# Patient Record
Sex: MALE | Race: WHITE | NOT HISPANIC OR LATINO | Employment: OTHER | URBAN - METROPOLITAN AREA
[De-identification: names, ages, dates, MRNs, and addresses within clinical notes are randomized per-mention and may not be internally consistent; named-entity substitution may affect disease eponyms.]

---

## 2023-10-07 ENCOUNTER — APPOINTMENT (OUTPATIENT)
Dept: RADIOLOGY | Facility: HOSPITAL | Age: 83
End: 2023-10-07
Payer: MEDICARE

## 2023-10-07 ENCOUNTER — HOSPITAL ENCOUNTER (EMERGENCY)
Facility: HOSPITAL | Age: 83
Discharge: HOME | End: 2023-10-07
Attending: STUDENT IN AN ORGANIZED HEALTH CARE EDUCATION/TRAINING PROGRAM
Payer: MEDICARE

## 2023-10-07 VITALS
HEIGHT: 68 IN | OXYGEN SATURATION: 98 % | RESPIRATION RATE: 18 BRPM | BODY MASS INDEX: 31.83 KG/M2 | TEMPERATURE: 97 F | WEIGHT: 210 LBS | DIASTOLIC BLOOD PRESSURE: 89 MMHG | SYSTOLIC BLOOD PRESSURE: 146 MMHG | HEART RATE: 84 BPM

## 2023-10-07 DIAGNOSIS — M51.36 BULGE OF LUMBAR DISC WITHOUT MYELOPATHY: Primary | ICD-10-CM

## 2023-10-07 DIAGNOSIS — G89.29 CHRONIC BILATERAL LOW BACK PAIN, UNSPECIFIED WHETHER SCIATICA PRESENT: ICD-10-CM

## 2023-10-07 DIAGNOSIS — M54.50 CHRONIC BILATERAL LOW BACK PAIN, UNSPECIFIED WHETHER SCIATICA PRESENT: ICD-10-CM

## 2023-10-07 PROCEDURE — G1004 CDSM NDSC: HCPCS

## 2023-10-07 PROCEDURE — 2500000005 HC RX 250 GENERAL PHARMACY W/O HCPCS: Performed by: STUDENT IN AN ORGANIZED HEALTH CARE EDUCATION/TRAINING PROGRAM

## 2023-10-07 PROCEDURE — 99284 EMERGENCY DEPT VISIT MOD MDM: CPT | Performed by: STUDENT IN AN ORGANIZED HEALTH CARE EDUCATION/TRAINING PROGRAM

## 2023-10-07 PROCEDURE — 76377 3D RENDER W/INTRP POSTPROCES: CPT | Performed by: RADIOLOGY

## 2023-10-07 PROCEDURE — 2500000004 HC RX 250 GENERAL PHARMACY W/ HCPCS (ALT 636 FOR OP/ED): Performed by: STUDENT IN AN ORGANIZED HEALTH CARE EDUCATION/TRAINING PROGRAM

## 2023-10-07 PROCEDURE — 72192 CT PELVIS W/O DYE: CPT | Performed by: RADIOLOGY

## 2023-10-07 RX ORDER — LIDOCAINE 50 MG/G
1 PATCH TOPICAL DAILY
Qty: 10 PATCH | Refills: 0 | Status: SHIPPED | OUTPATIENT
Start: 2023-10-07

## 2023-10-07 RX ORDER — HYDROMORPHONE HYDROCHLORIDE 1 MG/ML
1 INJECTION, SOLUTION INTRAMUSCULAR; INTRAVENOUS; SUBCUTANEOUS ONCE
Status: COMPLETED | OUTPATIENT
Start: 2023-10-07 | End: 2023-10-07

## 2023-10-07 RX ORDER — KETOROLAC TROMETHAMINE 30 MG/ML
15 INJECTION, SOLUTION INTRAMUSCULAR; INTRAVENOUS ONCE
Status: COMPLETED | OUTPATIENT
Start: 2023-10-07 | End: 2023-10-07

## 2023-10-07 RX ORDER — LIDOCAINE 560 MG/1
1 PATCH PERCUTANEOUS; TOPICAL; TRANSDERMAL ONCE
Status: DISCONTINUED | OUTPATIENT
Start: 2023-10-07 | End: 2023-10-07 | Stop reason: HOSPADM

## 2023-10-07 RX ORDER — HYDROCODONE BITARTRATE AND ACETAMINOPHEN 5; 325 MG/1; MG/1
1 TABLET ORAL EVERY 6 HOURS PRN
Qty: 12 TABLET | Refills: 0 | Status: SHIPPED | OUTPATIENT
Start: 2023-10-07 | End: 2023-10-10

## 2023-10-07 RX ADMIN — HYDROMORPHONE HYDROCHLORIDE 1 MG: 1 INJECTION, SOLUTION INTRAMUSCULAR; INTRAVENOUS; SUBCUTANEOUS at 03:52

## 2023-10-07 RX ADMIN — LIDOCAINE 1 PATCH: 4 PATCH TOPICAL at 03:53

## 2023-10-07 RX ADMIN — KETOROLAC TROMETHAMINE 15 MG: 30 INJECTION, SOLUTION INTRAMUSCULAR at 03:52

## 2023-10-07 ASSESSMENT — COLUMBIA-SUICIDE SEVERITY RATING SCALE - C-SSRS
1. IN THE PAST MONTH, HAVE YOU WISHED YOU WERE DEAD OR WISHED YOU COULD GO TO SLEEP AND NOT WAKE UP?: NO
2. HAVE YOU ACTUALLY HAD ANY THOUGHTS OF KILLING YOURSELF?: NO
6. HAVE YOU EVER DONE ANYTHING, STARTED TO DO ANYTHING, OR PREPARED TO DO ANYTHING TO END YOUR LIFE?: NO

## 2023-10-07 ASSESSMENT — LIFESTYLE VARIABLES
HAVE YOU EVER FELT YOU SHOULD CUT DOWN ON YOUR DRINKING: NO
EVER HAD A DRINK FIRST THING IN THE MORNING TO STEADY YOUR NERVES TO GET RID OF A HANGOVER: NO
HAVE PEOPLE ANNOYED YOU BY CRITICIZING YOUR DRINKING: NO
EVER FELT BAD OR GUILTY ABOUT YOUR DRINKING: NO

## 2023-10-07 ASSESSMENT — PAIN DESCRIPTION - LOCATION: LOCATION: HIP

## 2023-10-07 ASSESSMENT — PAIN SCALES - GENERAL: PAINLEVEL_OUTOF10: 10 - WORST POSSIBLE PAIN

## 2023-10-07 ASSESSMENT — PAIN - FUNCTIONAL ASSESSMENT: PAIN_FUNCTIONAL_ASSESSMENT: 0-10

## 2023-10-07 NOTE — ED PROVIDER NOTES
"HPI   Chief Complaint   Patient presents with    Back Pain     Chronic lower back pain       HPI  Patient presents to the ER with complaints of persistent pelvic pain and back pain.  Its been ongoing for multiple weeks now.  He tells me he is admitted to the hospital several weeks ago and had a CT and MRI.  [facility].  He states he is taking Cymbalta for pain without relief.  He states \"[facility] and I just got delivered today so for the last week I have not really been taking anything \".                  Yung Coma Scale Score: 15                  Patient History   No past medical history on file.  Past Surgical History:   Procedure Laterality Date    US GUIDED BIOPSY PROSTATE  8/21/2019    US GUIDED BIOPSY PROSTATE 8/21/2019 [facility]     No family history on file.  Social History     Tobacco Use    Smoking status: Not on file    Smokeless tobacco: Not on file   Substance Use Topics    Alcohol use: Not on file    Drug use: Not on file       Physical Exam   ED Triage Vitals [10/07/23 0255]   Temp Heart Rate Resp BP   36.1 °C (97 °F) 95 16 (!) 148/91      SpO2 Temp Source Heart Rate Source Patient Position   99 % Temporal -- --      BP Location FiO2 (%)     -- --       Physical Exam  Vitals and nursing note reviewed.   Constitutional:       General: He is not in acute distress.     Appearance: He is well-developed.   HENT:      Head: Normocephalic and atraumatic.   Eyes:      Conjunctiva/sclera: Conjunctivae normal.   Cardiovascular:      Rate and Rhythm: Normal rate and regular rhythm.      Heart sounds: No murmur heard.  Pulmonary:      Effort: Pulmonary effort is normal. No respiratory distress.      Breath sounds: Normal breath sounds.   Abdominal:      Palpations: Abdomen is soft.      Tenderness: There is no abdominal tenderness.   Musculoskeletal:         General: No swelling.      Cervical back: Neck supple.      Comments: Reproducible " "tenderness to palpation along the bilateral posterior and lateral iliac crest.  No midline vertebral tenderness or deformities or step-offs are palpable.   Skin:     General: Skin is warm and dry.      Capillary Refill: Capillary refill takes less than 2 seconds.   Neurological:      Mental Status: He is alert.      Comments: GCS 15 alert and oriented x4.  5 out of 5 strength in bilateral upper and lower extremities.  No saddle anesthesia.   Psychiatric:         Mood and Affect: Mood normal.         ED Course & MDM   Diagnoses as of 10/07/23 0504   Bulge of lumbar disc without myelopathy   Chronic bilateral low back pain, unspecified whether sciatica present     Patient presents to the ER with complaints of persistent pelvic pain and back pain.  Its been ongoing for multiple weeks now.  He tells me he is admitted to the hospital several weeks ago and had a CT and MRI.  He is due to follow[facility] with [facility].  He states he is taking Cymbalta for pain without relief.  He states \"[facility] and I just got delivered today so for the last week I have not really been taking anything \".    Patient denies any urinary retention or incontinence.  Denies any bowel incontinence or retention.  Denies any saddle anesthesia.  States he has normal sensation over his anal sphincter when he wipes after bowel movements.  Denies history of cancer.  I saw and chart reviewed the patient actually had an MRI done last month as well for his lumbar spine.  It did not show neurological/spinal cord involvement but rather disc bulges at multiple levels in the lumbar region.  His CT scan at that time showed persistent findings.  Today, I did a CT of the pelvis to evaluate for possible occult fracture and none were noted.  His pain was well controlled with IM Toradol and Dilaudid.  Recently, he had normal renal function done and I ensured this prior to giving him Toradol.  He tells me his pain is improved significantly. "  I will discharge him on Lidoderm patch and short course of Norco and he will follow-up with his doctor.  Medical Decision Making      Procedure  Procedures     Ganesh Renee MD  10/07/23 1359

## 2023-10-17 ENCOUNTER — HOSPITAL ENCOUNTER (EMERGENCY)
Facility: HOSPITAL | Age: 83
Discharge: HOME | End: 2023-10-17
Payer: MEDICARE

## 2023-10-17 VITALS
WEIGHT: 210 LBS | DIASTOLIC BLOOD PRESSURE: 77 MMHG | SYSTOLIC BLOOD PRESSURE: 168 MMHG | RESPIRATION RATE: 16 BRPM | OXYGEN SATURATION: 97 % | TEMPERATURE: 97.9 F | HEIGHT: 68 IN | BODY MASS INDEX: 31.83 KG/M2 | HEART RATE: 87 BPM

## 2023-10-17 DIAGNOSIS — M54.50 LUMBAR BACK PAIN: Primary | ICD-10-CM

## 2023-10-17 PROCEDURE — 2500000004 HC RX 250 GENERAL PHARMACY W/ HCPCS (ALT 636 FOR OP/ED): Performed by: PHYSICIAN ASSISTANT

## 2023-10-17 PROCEDURE — 99284 EMERGENCY DEPT VISIT MOD MDM: CPT

## 2023-10-17 PROCEDURE — 2500000001 HC RX 250 WO HCPCS SELF ADMINISTERED DRUGS (ALT 637 FOR MEDICARE OP): Performed by: PHYSICIAN ASSISTANT

## 2023-10-17 RX ORDER — ACETAMINOPHEN 325 MG/1
650 TABLET ORAL EVERY 6 HOURS PRN
Qty: 30 TABLET | Refills: 0 | Status: SHIPPED | OUTPATIENT
Start: 2023-10-17 | End: 2023-10-22

## 2023-10-17 RX ORDER — OXYCODONE AND ACETAMINOPHEN 5; 325 MG/1; MG/1
1 TABLET ORAL ONCE
Status: COMPLETED | OUTPATIENT
Start: 2023-10-17 | End: 2023-10-17

## 2023-10-17 RX ORDER — KETOROLAC TROMETHAMINE 30 MG/ML
15 INJECTION, SOLUTION INTRAMUSCULAR; INTRAVENOUS ONCE
Status: COMPLETED | OUTPATIENT
Start: 2023-10-17 | End: 2023-10-17

## 2023-10-17 RX ADMIN — KETOROLAC TROMETHAMINE 15 MG: 30 INJECTION, SOLUTION INTRAMUSCULAR at 04:46

## 2023-10-17 RX ADMIN — OXYCODONE HYDROCHLORIDE AND ACETAMINOPHEN 1 TABLET: 5; 325 TABLET ORAL at 04:54

## 2023-10-17 ASSESSMENT — COLUMBIA-SUICIDE SEVERITY RATING SCALE - C-SSRS
2. HAVE YOU ACTUALLY HAD ANY THOUGHTS OF KILLING YOURSELF?: NO
1. IN THE PAST MONTH, HAVE YOU WISHED YOU WERE DEAD OR WISHED YOU COULD GO TO SLEEP AND NOT WAKE UP?: NO
6. HAVE YOU EVER DONE ANYTHING, STARTED TO DO ANYTHING, OR PREPARED TO DO ANYTHING TO END YOUR LIFE?: NO

## 2023-10-17 NOTE — ED PROVIDER NOTES
"HPI   Chief Complaint   Patient presents with    Back Pain     Pt states he has been having back pain, he was recently admitted for 6 days and \"had to have needles in his back\"       83-year-old male with a history of hypertension and diabetes, arthritis presenting to the ED today with continued lower back pain.  Patient states he has had this pain for years and is normally in pain management where he gets injections into his back.  He is not scheduled to see pain management until next month and he has not had any recent falls or injuries but he is still having pain in his lower back that is not relieved with taking Lidoderm patches at home.  Patient tells me he was recently admitted for this and he was in the ER 7 days ago for this pain.  Pain is only in his lower back, sometimes radiates down to the mid thigh in both legs but he has no numbness or weakness or paresthesias.  He denies abdominal pain or scrotal pain or swelling.  He denies fever, nausea or vomiting or changes to his appetite.  He has been having normal bowel movements and denies difficulty urinating, painful urination or blood in his urine.  He denies loss of bowel or bladder control or saddle anesthesias.  He states that he ran out of pain medication at home.  No further complaints at this time.      History provided by:  Patient and spouse                      No data recorded                Patient History   History reviewed. No pertinent past medical history.  Past Surgical History:   Procedure Laterality Date    US GUIDED BIOPSY PROSTATE  8/21/2019    US GUIDED BIOPSY PROSTATE 8/21/2019 [facility]     No family history on file.  Social History     Tobacco Use    Smoking status: Not on file    Smokeless tobacco: Not on file   Substance Use Topics    Alcohol use: Not on file    Drug use: Not on file       Physical Exam   ED Triage Vitals [10/17/23 0349]   Temp Heart Rate Resp BP   36.6 °C (97.9 °F) 91 16 174/79      SpO2 Temp Source " Heart Rate Source Patient Position   96 % Temporal -- Sitting      BP Location FiO2 (%)     Right arm --       Physical Exam  Constitutional:       General: He is not in acute distress.     Appearance: Normal appearance. He is not toxic-appearing.   Eyes:      Conjunctiva/sclera: Conjunctivae normal.   Cardiovascular:      Rate and Rhythm: Normal rate and regular rhythm.      Pulses: Normal pulses.   Pulmonary:      Effort: Pulmonary effort is normal.      Breath sounds: Normal breath sounds.   Abdominal:      General: Bowel sounds are normal. There is no distension.      Palpations: Abdomen is soft. There is no mass.      Tenderness: There is no abdominal tenderness. There is no right CVA tenderness, left CVA tenderness or rebound.      Hernia: No hernia is present.   Musculoskeletal:      Cervical back: No rigidity.      Right lower leg: No edema.      Left lower leg: No edema.      Comments: Lower lumbar spinal and paraspinal tenderness without step-offs or erythema or edema or ecchymosis.  5/5 strength and sensation to bilateral lower extremities without bony tenderness or bony deformity.  No calf tenderness or swelling bilaterally. NVI   Skin:     General: Skin is warm and dry.      Capillary Refill: Capillary refill takes less than 2 seconds.   Neurological:      Mental Status: He is alert and oriented to person, place, and time. Mental status is at baseline.         ED Course & MDM   Diagnoses as of 10/17/23 0609   Lumbar back pain       Medical Decision Making  83-year-old male with history of hypertension and diabetes, arthritis and chronic low back pain presenting to the ED today with continued lower back pain.  There is been no fall or injury and patient was admitted to the beginning of September where he had an MRI performed.  He was [facility] as well as with pain management as he has already been seen by them previously.  His appointment is not until the beginning of November  and he already saw the [facility] but they did not give him anything further for pain.  He is still having pain in the lower back intermittently radiating down to the bilateral mid thighs but no numbness or weakness or paresthesias associated with this.  No saddle anesthesias or loss of bowel or bladder control or difficulty urinating.  He has no abdominal pain and does not feel lightheaded or dizzy.  He has no further associated complaints and arrives afebrile with stable vital signs.  He is nontoxic-appearing, awake alert and oriented and his speech is clear.  He is resting without signs of acute distress.  On my exam heart RRR, lungs are clear and abdomen soft nondistended nontender with normal bowel sounds.  There is no peripheral edema or calf tenderness and is good distal pulses, cap refill less than 2 seconds and is perfusing well.  He is neurovascularly intact throughout the bilateral lower extremities.  He is tender to palpation both spinal and paraspinal over the lower lumbar spine but there is no signs of trauma or infection.  His exam is otherwise within normal limits.  Patient already has Lidoderm patch on, he tells me the IM injection he had last time helped his pain so I did order him IM Toradol as he does have normal renal function due to his recent old labs.  I also ordered a p.o. Percocet and he will be reassessed.    I did review that the patient had an MRI of his L-spine performed at the beginning of September and showed some arthritic changes and disc bulges however no central spinal canal stenosis.  He also had a CT pelvis performed on 10/7, 10 days ago recent at his recent visit which did not show any acute osseous abnormality.  Patient is scheduled to see pain management, he does not have any neurologic deficits today, vital signs are stable and his pain has been ongoing for years.    On reassessment patient is resting comfortably and feels improved after medication given today.  He has not been  taking anything p.o. at home for pain relief other than topical Lidoderm patches.  He had a recent prescription of Norco home-going and I discussed with the patient that we will supplement his Lidoderm patches at this time with Tylenol.  I do not feel he would benefit from steroids as he is a diabetic.  He is not having any neurologic deficits or loss of bowel or bladder control.  No fevers and he is otherwise resting comfortably and is neurologically intact.  He has follow-up with pain management next week.  I did discuss results, diagnosis and treatment plan as well as warning signs to return to the ER and he and spouse expressed understanding and agreed with this plan today.        Procedure  Procedures     Diana Junior PA-C  10/17/23 0611

## 2023-10-22 ENCOUNTER — HOSPITAL ENCOUNTER (EMERGENCY)
Facility: HOSPITAL | Age: 83
Discharge: HOME | End: 2023-10-22
Attending: STUDENT IN AN ORGANIZED HEALTH CARE EDUCATION/TRAINING PROGRAM
Payer: MEDICARE

## 2023-10-22 VITALS
TEMPERATURE: 97.7 F | DIASTOLIC BLOOD PRESSURE: 66 MMHG | HEIGHT: 68 IN | BODY MASS INDEX: 31.83 KG/M2 | RESPIRATION RATE: 17 BRPM | HEART RATE: 77 BPM | WEIGHT: 210 LBS | SYSTOLIC BLOOD PRESSURE: 153 MMHG | OXYGEN SATURATION: 100 %

## 2023-10-22 DIAGNOSIS — G89.29 CHRONIC BILATERAL LOW BACK PAIN, UNSPECIFIED WHETHER SCIATICA PRESENT: Primary | ICD-10-CM

## 2023-10-22 DIAGNOSIS — M54.50 CHRONIC BILATERAL LOW BACK PAIN, UNSPECIFIED WHETHER SCIATICA PRESENT: Primary | ICD-10-CM

## 2023-10-22 PROCEDURE — 2500000004 HC RX 250 GENERAL PHARMACY W/ HCPCS (ALT 636 FOR OP/ED): Performed by: STUDENT IN AN ORGANIZED HEALTH CARE EDUCATION/TRAINING PROGRAM

## 2023-10-22 PROCEDURE — 99284 EMERGENCY DEPT VISIT MOD MDM: CPT | Performed by: STUDENT IN AN ORGANIZED HEALTH CARE EDUCATION/TRAINING PROGRAM

## 2023-10-22 PROCEDURE — 2500000005 HC RX 250 GENERAL PHARMACY W/O HCPCS: Performed by: STUDENT IN AN ORGANIZED HEALTH CARE EDUCATION/TRAINING PROGRAM

## 2023-10-22 PROCEDURE — 96372 THER/PROPH/DIAG INJ SC/IM: CPT

## 2023-10-22 PROCEDURE — 99283 EMERGENCY DEPT VISIT LOW MDM: CPT | Mod: 25

## 2023-10-22 RX ORDER — LIDOCAINE 40 MG/G
CREAM TOPICAL AS NEEDED
Qty: 10 EACH | Refills: 0 | Status: SHIPPED | OUTPATIENT
Start: 2023-10-22

## 2023-10-22 RX ORDER — HYDROMORPHONE HYDROCHLORIDE 1 MG/ML
1 INJECTION, SOLUTION INTRAMUSCULAR; INTRAVENOUS; SUBCUTANEOUS ONCE
Status: COMPLETED | OUTPATIENT
Start: 2023-10-22 | End: 2023-10-22

## 2023-10-22 RX ORDER — ACETAMINOPHEN 325 MG/1
975 TABLET ORAL ONCE
Status: DISCONTINUED | OUTPATIENT
Start: 2023-10-22 | End: 2023-10-22 | Stop reason: HOSPADM

## 2023-10-22 RX ORDER — KETOROLAC TROMETHAMINE 30 MG/ML
15 INJECTION, SOLUTION INTRAMUSCULAR; INTRAVENOUS ONCE
Status: COMPLETED | OUTPATIENT
Start: 2023-10-22 | End: 2023-10-22

## 2023-10-22 RX ORDER — OXYCODONE HYDROCHLORIDE 5 MG/1
5 TABLET ORAL EVERY 6 HOURS PRN
Qty: 12 TABLET | Refills: 0 | Status: SHIPPED | OUTPATIENT
Start: 2023-10-22 | End: 2023-10-25

## 2023-10-22 RX ORDER — HYDROMORPHONE HYDROCHLORIDE 1 MG/ML
1 INJECTION, SOLUTION INTRAMUSCULAR; INTRAVENOUS; SUBCUTANEOUS ONCE
Status: DISCONTINUED | OUTPATIENT
Start: 2023-10-22 | End: 2023-10-22

## 2023-10-22 RX ORDER — LIDOCAINE 560 MG/1
2 PATCH PERCUTANEOUS; TOPICAL; TRANSDERMAL ONCE
Status: DISCONTINUED | OUTPATIENT
Start: 2023-10-22 | End: 2023-10-22 | Stop reason: HOSPADM

## 2023-10-22 RX ADMIN — KETOROLAC TROMETHAMINE 15 MG: 30 INJECTION, SOLUTION INTRAMUSCULAR at 03:14

## 2023-10-22 RX ADMIN — LIDOCAINE 2 PATCH: 4 PATCH TOPICAL at 03:13

## 2023-10-22 RX ADMIN — HYDROMORPHONE HYDROCHLORIDE 1 MG: 1 INJECTION, SOLUTION INTRAMUSCULAR; INTRAVENOUS; SUBCUTANEOUS at 03:31

## 2023-10-22 ASSESSMENT — PAIN - FUNCTIONAL ASSESSMENT: PAIN_FUNCTIONAL_ASSESSMENT: 0-10

## 2023-10-22 ASSESSMENT — LIFESTYLE VARIABLES
HAVE YOU EVER FELT YOU SHOULD CUT DOWN ON YOUR DRINKING: NO
EVER HAD A DRINK FIRST THING IN THE MORNING TO STEADY YOUR NERVES TO GET RID OF A HANGOVER: NO
HAVE PEOPLE ANNOYED YOU BY CRITICIZING YOUR DRINKING: NO
REASON UNABLE TO ASSESS: NO
EVER FELT BAD OR GUILTY ABOUT YOUR DRINKING: NO

## 2023-10-22 ASSESSMENT — PAIN DESCRIPTION - PAIN TYPE: TYPE: ACUTE PAIN

## 2023-10-22 ASSESSMENT — PAIN DESCRIPTION - DESCRIPTORS: DESCRIPTORS: STABBING

## 2023-10-22 ASSESSMENT — PAIN DESCRIPTION - LOCATION: LOCATION: BACK

## 2023-10-22 ASSESSMENT — PAIN DESCRIPTION - ORIENTATION: ORIENTATION: LOWER

## 2023-10-22 ASSESSMENT — PAIN SCALES - GENERAL
PAINLEVEL_OUTOF10: 8
PAINLEVEL_OUTOF10: 10 - WORST POSSIBLE PAIN
PAINLEVEL_OUTOF10: 10 - WORST POSSIBLE PAIN

## 2023-10-22 NOTE — ED PROVIDER NOTES
"HPI   Chief Complaint   Patient presents with    Back Pain     Pt states \"I've been here several times for back pain. They've done all the scans but say there is nothing they can do. [facility] but can't in this condition\"       Patient is an 83-year-old male with history of hypertension, diabetes, arthritis who presents for back pain.  Patient states he has had back pain similar to this for several months.  States it is across his low back.  Denies any new trauma.  Denies saddle anesthesia, urinary tension, bowel control problems, fevers, chills, IV drug use.  Patient's been seen several times for similar back pain and improved with pain medications.  Patient states he has an appointment on Monday to see his pain management doctor for further evaluation.                          No data recorded                Patient History   No past medical history on file.  Past Surgical History:   Procedure Laterality Date    US GUIDED BIOPSY PROSTATE  8/21/2019    US GUIDED BIOPSY PROSTATE 8/21/2019 [facility]     No family history on file.  Social History     Tobacco Use    Smoking status: Not on file    Smokeless tobacco: Not on file   Substance Use Topics    Alcohol use: Not on file    Drug use: Not on file       Physical Exam   ED Triage Vitals [10/22/23 0227]   Temp Heart Rate Resp BP   36.5 °C (97.7 °F) 93 18 (!) 185/89      SpO2 Temp Source Heart Rate Source Patient Position   96 % Temporal -- Sitting      BP Location FiO2 (%)     Right arm --       Physical Exam  Constitutional:       General: He is not in acute distress.  HENT:      Head: Normocephalic.   Eyes:      Extraocular Movements: Extraocular movements intact.      Conjunctiva/sclera: Conjunctivae normal.      Pupils: Pupils are equal, round, and reactive to light.   Cardiovascular:      Rate and Rhythm: Normal rate and regular rhythm.      Pulses: Normal pulses.      Heart sounds: Normal heart sounds.   Pulmonary: "      Effort: Pulmonary effort is normal.      Breath sounds: Normal breath sounds.   Abdominal:      General: There is no distension.      Palpations: Abdomen is soft. There is no mass.      Tenderness: There is no abdominal tenderness. There is no guarding.   Musculoskeletal:         General: No deformity.      Cervical back: Normal range of motion and neck supple.      Right lower leg: No edema.      Left lower leg: No edema.      Comments: TTP across low back   Skin:     General: Skin is warm and dry.      Findings: No lesion or rash.   Neurological:      General: No focal deficit present.      Mental Status: He is alert and oriented to person, place, and time. Mental status is at baseline.      Cranial Nerves: No cranial nerve deficit.      Sensory: No sensory deficit.      Motor: No weakness.   Psychiatric:         Mood and Affect: Mood normal.         ED Course & MDM   Diagnoses as of 10/22/23 0434   Chronic bilateral low back pain, unspecified whether sciatica present       Medical Decision Making  Patient is an 83-year-old male with above-stated past medical history who presents for back pain.  Patient is no red flag symptoms on my history or physical exam.  His back pain appears chronic.  He has no new trauma.  I did review the patient's laboratory results from September and his creatinine is appropriate.  He was given a dose of Toradol, Dilaudid, lidocaine patch in the emergency department.  I have low suspicion for cauda equina, spinal dural abscess, cord compression syndrome.  He is not tachycardic or febrile and appears currently well and nontoxic.  On reevaluation he does appear more comfortable and states his pain is improved.  I do believe the patient would benefit from seeing his physician on Monday and patient agrees with this.  I do not believe he requires admission at this time. I reviewed the patient's recent imaging results and do not believe he requires additional imaging at this time given  his history and physical exam.  Patient was given a short course of oxycodone, and a prescription for lidocaine patches.  Patient was discharged home with return precautions and follow-up instructions.    Disclaimer: This note was dictated using speech recognition software. Minor errors in transcription may be present. Please call if questions.     Robby Ross MD  [facility] Emergency Medicine  Contact on [employer]            Procedure  Procedures     Robby Ross MD  10/22/23 0435

## 2024-01-09 ENCOUNTER — APPOINTMENT (OUTPATIENT)
Dept: RADIOLOGY | Facility: HOSPITAL | Age: 84
DRG: 552 | End: 2024-01-09
Payer: OTHER GOVERNMENT

## 2024-01-09 ENCOUNTER — APPOINTMENT (OUTPATIENT)
Dept: CARDIOLOGY | Facility: HOSPITAL | Age: 84
DRG: 552 | End: 2024-01-09
Payer: OTHER GOVERNMENT

## 2024-01-09 ENCOUNTER — HOSPITAL ENCOUNTER (INPATIENT)
Facility: HOSPITAL | Age: 84
LOS: 1 days | Discharge: ANOTHER HOSPITAL | DRG: 552 | End: 2024-01-11
Attending: STUDENT IN AN ORGANIZED HEALTH CARE EDUCATION/TRAINING PROGRAM | Admitting: INTERNAL MEDICINE
Payer: OTHER GOVERNMENT

## 2024-01-09 DIAGNOSIS — M54.59 INTRACTABLE LOW BACK PAIN: Primary | ICD-10-CM

## 2024-01-09 DIAGNOSIS — Z78.9 FAILURE OF OUTPATIENT TREATMENT: ICD-10-CM

## 2024-01-09 DIAGNOSIS — M46.46 LUMBAR DISCITIS: ICD-10-CM

## 2024-01-09 DIAGNOSIS — M46.26 OSTEOMYELITIS OF LUMBAR SPINE (MULTI): ICD-10-CM

## 2024-01-09 DIAGNOSIS — R29.6 FREQUENT FALLS: ICD-10-CM

## 2024-01-09 DIAGNOSIS — M46.26 ACUTE OSTEOMYELITIS OF LUMBAR SPINE (MULTI): ICD-10-CM

## 2024-01-09 LAB
ALBUMIN SERPL BCP-MCNC: 3.8 G/DL (ref 3.4–5)
ALP SERPL-CCNC: 56 U/L (ref 33–136)
ALT SERPL W P-5'-P-CCNC: 11 U/L (ref 10–52)
ANION GAP SERPL CALC-SCNC: 16 MMOL/L (ref 10–20)
AST SERPL W P-5'-P-CCNC: 13 U/L (ref 9–39)
BASOPHILS # BLD AUTO: 0.08 X10*3/UL (ref 0–0.1)
BASOPHILS NFR BLD AUTO: 0.7 %
BILIRUB SERPL-MCNC: 0.4 MG/DL (ref 0–1.2)
BUN SERPL-MCNC: 42 MG/DL (ref 6–23)
CALCIUM SERPL-MCNC: 9.1 MG/DL (ref 8.6–10.3)
CARDIAC TROPONIN I PNL SERPL HS: 10 NG/L (ref 0–20)
CARDIAC TROPONIN I PNL SERPL HS: 11 NG/L (ref 0–20)
CHLORIDE SERPL-SCNC: 94 MMOL/L (ref 98–107)
CO2 SERPL-SCNC: 25 MMOL/L (ref 21–32)
CREAT SERPL-MCNC: 1.25 MG/DL (ref 0.5–1.3)
CRP SERPL-MCNC: 7.25 MG/DL
EGFRCR SERPLBLD CKD-EPI 2021: 58 ML/MIN/1.73M*2
EOSINOPHIL # BLD AUTO: 0.19 X10*3/UL (ref 0–0.4)
EOSINOPHIL NFR BLD AUTO: 1.7 %
ERYTHROCYTE [DISTWIDTH] IN BLOOD BY AUTOMATED COUNT: 13.8 % (ref 11.5–14.5)
ERYTHROCYTE [SEDIMENTATION RATE] IN BLOOD BY WESTERGREN METHOD: 37 MM/H (ref 0–20)
GLUCOSE SERPL-MCNC: 320 MG/DL (ref 74–99)
HCT VFR BLD AUTO: 32 % (ref 41–52)
HGB BLD-MCNC: 10.8 G/DL (ref 13.5–17.5)
IMM GRANULOCYTES # BLD AUTO: 0.04 X10*3/UL (ref 0–0.5)
IMM GRANULOCYTES NFR BLD AUTO: 0.4 % (ref 0–0.9)
INR PPP: 1 (ref 0.9–1.1)
LACTATE SERPL-SCNC: 1.5 MMOL/L (ref 0.4–2)
LACTATE SERPL-SCNC: 2.5 MMOL/L (ref 0.4–2)
LYMPHOCYTES # BLD AUTO: 1.69 X10*3/UL (ref 0.8–3)
LYMPHOCYTES NFR BLD AUTO: 15.5 %
MAGNESIUM SERPL-MCNC: 2.4 MG/DL (ref 1.6–2.4)
MCH RBC QN AUTO: 29.1 PG (ref 26–34)
MCHC RBC AUTO-ENTMCNC: 33.8 G/DL (ref 32–36)
MCV RBC AUTO: 86 FL (ref 80–100)
MONOCYTES # BLD AUTO: 1.61 X10*3/UL (ref 0.05–0.8)
MONOCYTES NFR BLD AUTO: 14.8 %
NEUTROPHILS # BLD AUTO: 7.26 X10*3/UL (ref 1.6–5.5)
NEUTROPHILS NFR BLD AUTO: 66.9 %
NRBC BLD-RTO: 0 /100 WBCS (ref 0–0)
PLATELET # BLD AUTO: 504 X10*3/UL (ref 150–450)
POTASSIUM SERPL-SCNC: 3.9 MMOL/L (ref 3.5–5.3)
PROT SERPL-MCNC: 6.7 G/DL (ref 6.4–8.2)
PROTHROMBIN TIME: 11.1 SECONDS (ref 9.8–12.8)
RBC # BLD AUTO: 3.71 X10*6/UL (ref 4.5–5.9)
SODIUM SERPL-SCNC: 131 MMOL/L (ref 136–145)
WBC # BLD AUTO: 10.9 X10*3/UL (ref 4.4–11.3)

## 2024-01-09 PROCEDURE — 72125 CT NECK SPINE W/O DYE: CPT | Performed by: RADIOLOGY

## 2024-01-09 PROCEDURE — 99285 EMERGENCY DEPT VISIT HI MDM: CPT | Performed by: STUDENT IN AN ORGANIZED HEALTH CARE EDUCATION/TRAINING PROGRAM

## 2024-01-09 PROCEDURE — 72125 CT NECK SPINE W/O DYE: CPT

## 2024-01-09 PROCEDURE — 72128 CT CHEST SPINE W/O DYE: CPT | Performed by: RADIOLOGY

## 2024-01-09 PROCEDURE — 71045 X-RAY EXAM CHEST 1 VIEW: CPT

## 2024-01-09 PROCEDURE — 72131 CT LUMBAR SPINE W/O DYE: CPT | Performed by: RADIOLOGY

## 2024-01-09 PROCEDURE — 84484 ASSAY OF TROPONIN QUANT: CPT | Performed by: PHYSICIAN ASSISTANT

## 2024-01-09 PROCEDURE — 96361 HYDRATE IV INFUSION ADD-ON: CPT

## 2024-01-09 PROCEDURE — 72157 MRI CHEST SPINE W/O & W/DYE: CPT | Performed by: STUDENT IN AN ORGANIZED HEALTH CARE EDUCATION/TRAINING PROGRAM

## 2024-01-09 PROCEDURE — 72157 MRI CHEST SPINE W/O & W/DYE: CPT

## 2024-01-09 PROCEDURE — 72158 MRI LUMBAR SPINE W/O & W/DYE: CPT | Performed by: STUDENT IN AN ORGANIZED HEALTH CARE EDUCATION/TRAINING PROGRAM

## 2024-01-09 PROCEDURE — 2550000001 HC RX 255 CONTRASTS: Performed by: STUDENT IN AN ORGANIZED HEALTH CARE EDUCATION/TRAINING PROGRAM

## 2024-01-09 PROCEDURE — 85652 RBC SED RATE AUTOMATED: CPT | Performed by: PHYSICIAN ASSISTANT

## 2024-01-09 PROCEDURE — 36415 COLL VENOUS BLD VENIPUNCTURE: CPT | Performed by: PHYSICIAN ASSISTANT

## 2024-01-09 PROCEDURE — 2500000004 HC RX 250 GENERAL PHARMACY W/ HCPCS (ALT 636 FOR OP/ED): Performed by: PHYSICIAN ASSISTANT

## 2024-01-09 PROCEDURE — 70450 CT HEAD/BRAIN W/O DYE: CPT

## 2024-01-09 PROCEDURE — 72156 MRI NECK SPINE W/O & W/DYE: CPT | Performed by: STUDENT IN AN ORGANIZED HEALTH CARE EDUCATION/TRAINING PROGRAM

## 2024-01-09 PROCEDURE — 96360 HYDRATION IV INFUSION INIT: CPT

## 2024-01-09 PROCEDURE — 70450 CT HEAD/BRAIN W/O DYE: CPT | Performed by: RADIOLOGY

## 2024-01-09 PROCEDURE — 72170 X-RAY EXAM OF PELVIS: CPT

## 2024-01-09 PROCEDURE — 83735 ASSAY OF MAGNESIUM: CPT | Performed by: PHYSICIAN ASSISTANT

## 2024-01-09 PROCEDURE — 93005 ELECTROCARDIOGRAM TRACING: CPT

## 2024-01-09 PROCEDURE — 72156 MRI NECK SPINE W/O & W/DYE: CPT

## 2024-01-09 PROCEDURE — 72128 CT CHEST SPINE W/O DYE: CPT

## 2024-01-09 PROCEDURE — 72170 X-RAY EXAM OF PELVIS: CPT | Performed by: RADIOLOGY

## 2024-01-09 PROCEDURE — 86140 C-REACTIVE PROTEIN: CPT | Performed by: PHYSICIAN ASSISTANT

## 2024-01-09 PROCEDURE — 85025 COMPLETE CBC W/AUTO DIFF WBC: CPT | Performed by: PHYSICIAN ASSISTANT

## 2024-01-09 PROCEDURE — 71045 X-RAY EXAM CHEST 1 VIEW: CPT | Performed by: RADIOLOGY

## 2024-01-09 PROCEDURE — 83605 ASSAY OF LACTIC ACID: CPT | Performed by: PHYSICIAN ASSISTANT

## 2024-01-09 PROCEDURE — 85610 PROTHROMBIN TIME: CPT | Performed by: PHYSICIAN ASSISTANT

## 2024-01-09 PROCEDURE — 72131 CT LUMBAR SPINE W/O DYE: CPT

## 2024-01-09 PROCEDURE — 72158 MRI LUMBAR SPINE W/O & W/DYE: CPT

## 2024-01-09 PROCEDURE — A9575 INJ GADOTERATE MEGLUMI 0.1ML: HCPCS | Performed by: STUDENT IN AN ORGANIZED HEALTH CARE EDUCATION/TRAINING PROGRAM

## 2024-01-09 PROCEDURE — 80053 COMPREHEN METABOLIC PANEL: CPT | Performed by: PHYSICIAN ASSISTANT

## 2024-01-09 RX ORDER — ONDANSETRON HYDROCHLORIDE 2 MG/ML
4 INJECTION, SOLUTION INTRAVENOUS ONCE
Status: COMPLETED | OUTPATIENT
Start: 2024-01-09 | End: 2024-01-09

## 2024-01-09 RX ORDER — GADOTERATE MEGLUMINE 376.9 MG/ML
0.2 INJECTION INTRAVENOUS
Status: COMPLETED | OUTPATIENT
Start: 2024-01-09 | End: 2024-01-09

## 2024-01-09 RX ORDER — MORPHINE SULFATE 4 MG/ML
4 INJECTION, SOLUTION INTRAMUSCULAR; INTRAVENOUS ONCE
Status: COMPLETED | OUTPATIENT
Start: 2024-01-09 | End: 2024-01-09

## 2024-01-09 RX ADMIN — GADOTERATE MEGLUMINE 16 ML: 376.9 INJECTION INTRAVENOUS at 21:37

## 2024-01-09 RX ADMIN — MORPHINE SULFATE 4 MG: 4 INJECTION, SOLUTION INTRAMUSCULAR; INTRAVENOUS at 19:05

## 2024-01-09 RX ADMIN — SODIUM CHLORIDE, POTASSIUM CHLORIDE, SODIUM LACTATE AND CALCIUM CHLORIDE 1000 ML: 600; 310; 30; 20 INJECTION, SOLUTION INTRAVENOUS at 19:05

## 2024-01-09 RX ADMIN — ONDANSETRON 4 MG: 2 INJECTION, SOLUTION INTRAMUSCULAR; INTRAVENOUS at 19:06

## 2024-01-09 ASSESSMENT — LIFESTYLE VARIABLES
HAVE YOU EVER FELT YOU SHOULD CUT DOWN ON YOUR DRINKING: NO
REASON UNABLE TO ASSESS: NO
EVER FELT BAD OR GUILTY ABOUT YOUR DRINKING: NO
HAVE PEOPLE ANNOYED YOU BY CRITICIZING YOUR DRINKING: NO
EVER HAD A DRINK FIRST THING IN THE MORNING TO STEADY YOUR NERVES TO GET RID OF A HANGOVER: NO

## 2024-01-09 ASSESSMENT — PAIN SCALES - GENERAL: PAINLEVEL_OUTOF10: 0 - NO PAIN

## 2024-01-09 ASSESSMENT — PAIN - FUNCTIONAL ASSESSMENT: PAIN_FUNCTIONAL_ASSESSMENT: 0-10

## 2024-01-09 NOTE — ED PROVIDER NOTES
HPI   Chief Complaint   Patient presents with   • Weakness, Gen     Pt states discharged from [facility] after a long admission for spinal infection. Discharged to rehab. Wife states multiple falls at home from generalized weakness. The wife states that he did not lose consciousness during these episodes and was awake. Pt has been having a difficult time ambulating. Dr. Ross assessed pt in triage        This is an 83-year-old male who presents to the emergency room with chief complaint of worsening lower back pain leg weakness frequent falls and chills.  Patient states that he spent the last 5 weeks at [facility] being treated for a spinal infection.  He reports that he was on antibiotics the entire time he was in the hospital.  Patient is unsure how he got the infection.  No surgical intervention was performed.  Patient has been home for a week and according to the patient and family members 3 days ago he started noticing increased leg weakness lightheadedness and frequent falls.  He also reports increased low back pain with chills.  Denies any bladder or bowel dysfunction or saddle paresthesias.  He denies any headache, chest pain, heart palpitations, cough, shortness of breath.  He does have a history of chronic low back pain, hypertension, diabetes.  Patient denies any other complaints.  Rates his back pain a 10 out of a 10.  I did review the patient's discharge medication list which shows he is on gabapentin and cyclobenzaprine but no oral antibiotics.      History provided by:  Patient, medical records and relative   used: No                        No data recorded                Patient History   History reviewed. No pertinent past medical history.  Past Surgical History:   Procedure Laterality Date   • US GUIDED BIOPSY PROSTATE  8/21/2019    US GUIDED BIOPSY PROSTATE 8/21/2019 [facility]     No family history on file.  Social History     Tobacco Use   • Smoking status: Not on file    • Smokeless tobacco: Not on file   Substance Use Topics   • Alcohol use: Not on file   • Drug use: Not on file       Physical Exam   ED Triage Vitals [01/09/24 1608]   Temp Heart Rate Resp BP   36.4 °C (97.5 °F) 79 16 112/59      SpO2 Temp Source Heart Rate Source Patient Position   97 % Temporal Monitor --      BP Location FiO2 (%)     -- --       Physical Exam  Vitals and nursing note reviewed.   Constitutional:       General: He is awake. He is not in acute distress.     Appearance: Normal appearance. He is well-developed, well-groomed and overweight. He is not ill-appearing, toxic-appearing or diaphoretic.   HENT:      Head: Normocephalic and atraumatic.      Right Ear: Tympanic membrane, ear canal and external ear normal.      Left Ear: Tympanic membrane, ear canal and external ear normal.      Nose: Nose normal. No congestion.      Mouth/Throat:      Mouth: Mucous membranes are moist.      Pharynx: Oropharynx is clear.   Eyes:      Extraocular Movements: Extraocular movements intact.      Conjunctiva/sclera: Conjunctivae normal.      Pupils: Pupils are equal, round, and reactive to light.   Cardiovascular:      Rate and Rhythm: Normal rate and regular rhythm.      Pulses: Normal pulses.      Heart sounds: Normal heart sounds.   Pulmonary:      Effort: Pulmonary effort is normal.      Breath sounds: Normal breath sounds.   Abdominal:      General: Abdomen is flat. Bowel sounds are normal.      Palpations: Abdomen is soft.      Tenderness: There is no abdominal tenderness. There is no guarding.   Musculoskeletal:      Cervical back: Normal, normal range of motion and neck supple. No spinous process tenderness or muscular tenderness.      Thoracic back: Tenderness present. Decreased range of motion.      Lumbar back: Tenderness and bony tenderness present. Decreased range of motion.        Back:       Comments: Midline tenderness to the lower thoracic and the entire lumbar spine down to the sacral region, no  swelling, erythema, bruising, excessive warmth.   Lymphadenopathy:      Cervical: No cervical adenopathy.   Skin:     General: Skin is warm and dry.      Findings: No erythema or rash.   Neurological:      Mental Status: He is alert and oriented to person, place, and time. Mental status is at baseline.      GCS: GCS eye subscore is 4. GCS verbal subscore is 5. GCS motor subscore is 6.      Cranial Nerves: Cranial nerves 2-12 are intact.      Sensory: Sensation is intact.      Motor: Weakness present.      Coordination: Coordination is intact.      Comments: Bilateral leg weakness most noted with flexion extension of both hips and knees.  No atrophy.   Psychiatric:         Mood and Affect: Mood normal.         Behavior: Behavior normal. Behavior is cooperative.         Thought Content: Thought content normal.         Judgment: Judgment normal.         ED Course & MDM   Diagnoses as of 01/10/24 0036   Intractable low back pain   Frequent falls   Lumbar discitis   Acute osteomyelitis of lumbar spine (CMS/HCC)   Failure of outpatient treatment       Medical Decision Making  ED course temperature is 36 4 pulse 79 respiration 16 blood pressure 112/59, pulse ox was 97% on room air  I have ordered lab work, CT T scan of the head, cervical, thoracic, lumbar spine as well as lab work including ESR and CRP.  I spoke with Dr. Basilio neuroradiologist and got approval for MRI of the cervical thoracic and lumbar spine with concern for epidural abscess given his recent history of spinal infection, complaining of worsening leg weakness and chills.    EKG shows normal sinus rhythm with a right bundle branch block rate 72  QRS was 158 QT was 448 QTc was 490 no ischemic changes.  This was interpreted by me at 1857 hrs.  CBC white count is 10.9 hemoglobin 10.8 hematocrit 32 platelet count was 504, metabolic panel glucose 320 sodium 131 chloride 94, BUN 42 GFR 58, CRP is 7.25, first troponin was 11 repeat troponin was 10, lactic 2.5  sed rate 37 magnesium 2.40.  CT scan lumbar spine shows interval progression of lucent lytic erosive changes involving inferior L3 and superior L4 vertebral bodies without significant disc space narrowing findings are progressed from prior CT pelvis today.  New from prior MRI given differences in technique.  Changes are indeterminate and could represent discitis osteomyelitis malignancy less likely generative changes.  Otherwise no acute fracture dislocation no critical canal stenosis identified.  CT scan of thoracic spine showed no evidence of any acute fracture or subluxation, CT scan of the cervical spine shows minimal retrolisthesis of C3 on C4, C4 and C5 and C5 on C6.  No evidence of acute fracture or subluxation.  I did speak with Dr. Basilio who approved the MRI of the spine with and without contrast.    MRI report was reviewed:  Cervical spine:  No evidence of focal suspicious bone marrow lesions or discitis  osteomyelitis in the cervical spine. Multilevel uncal and synovial  facet arthropathy in the cervical spine with resultant moderate  central canal stenosis at C3-C4 and C5-C6. There is severe left  foraminal stenosis at C6-C7 due to posterolateral osseous spurring.      Thoracic spine:  Endplate degenerative change throughout the upper and midthoracic  spine without high-grade canal stenosis, bone marrow edema, or spinal  cord abnormality.      Lumbar spine:  Discitis osteomyelitis at L3-L4. There is posterior central enhancing  soft tissue at the level of L3-L4 consistent with phlegmonous change,  without evidence of significant central canal stenosis at this level  or epidural abscess. There is an enhancing Schmorl node within the  inferior endplate of L3 with extension into the disc space. There is  right greater than left paravertebral psoas muscle edema at the level  of L3-L4 related to the discitis osteomyelitis, without evidence of a  fluid collection. There is interval progression of  bilateral  foraminal stenosis at L3-L4 due to increased disc space narrowing  with diffuse disc bulge and facet hypertrophy.      Unchanged moderate degenerative bilateral foraminal stenosis at  L4-L5. No evidence of significant central canal stenosis.      Previously seen enhancing soft tissue within the right T12-L1 neural  foramen is unchanged in size and demonstrates homogeneous enhancement  consistent with a schwannoma. There is otherwise no abnormal  leptomeningeal enhancement. Nonspecific unchanged in size enhancing  bone marrow lesion within the right posterior ilium which may  possibly represent an atypical hemangioma. Attention on follow-up is  recommended.      Nondisplaced subacute fracture of the left L3 transverse process.      0020: I spoke with Dr. Cruz Orthorocío who reviewed the MRI reports and states that the patient has discitis and can stay here and she recommends IV antibiotics and getting infectious disease involved.  I paged out to the hospitalist service.  Dr. Gonzales will admit and started Vancomycin and Cefepime.         Procedure  Procedures     Bobo Rajput PA-C  01/10/24 0036

## 2024-01-10 PROBLEM — R53.1 GENERALIZED WEAKNESS: Status: ACTIVE | Noted: 2024-01-10

## 2024-01-10 PROBLEM — M54.59 INTRACTABLE LOW BACK PAIN: Status: ACTIVE | Noted: 2024-01-10

## 2024-01-10 PROBLEM — Z79.4 TYPE 2 DIABETES MELLITUS WITH RETINOPATHY, WITH LONG-TERM CURRENT USE OF INSULIN (MULTI): Status: ACTIVE | Noted: 2024-01-10

## 2024-01-10 PROBLEM — S32.009K: Status: ACTIVE | Noted: 2024-01-10

## 2024-01-10 PROBLEM — R42 DIZZINESS: Status: ACTIVE | Noted: 2024-01-10

## 2024-01-10 PROBLEM — M46.26 OSTEOMYELITIS OF LUMBAR SPINE (MULTI): Status: ACTIVE | Noted: 2024-01-10

## 2024-01-10 PROBLEM — R55 NEAR SYNCOPE: Status: ACTIVE | Noted: 2024-01-10

## 2024-01-10 PROBLEM — M46.46 DISCITIS OF LUMBAR REGION: Status: ACTIVE | Noted: 2024-01-10

## 2024-01-10 PROBLEM — E11.319 TYPE 2 DIABETES MELLITUS WITH RETINOPATHY, WITH LONG-TERM CURRENT USE OF INSULIN (MULTI): Status: ACTIVE | Noted: 2024-01-10

## 2024-01-10 LAB
APPEARANCE UR: CLEAR
BILIRUB UR STRIP.AUTO-MCNC: NEGATIVE MG/DL
COLOR UR: ABNORMAL
GLUCOSE BLD MANUAL STRIP-MCNC: 162 MG/DL (ref 74–99)
GLUCOSE BLD MANUAL STRIP-MCNC: 195 MG/DL (ref 74–99)
GLUCOSE BLD MANUAL STRIP-MCNC: 195 MG/DL (ref 74–99)
GLUCOSE BLD MANUAL STRIP-MCNC: 217 MG/DL (ref 74–99)
GLUCOSE UR STRIP.AUTO-MCNC: ABNORMAL MG/DL
HOLD SPECIMEN: NORMAL
HOLD SPECIMEN: NORMAL
KETONES UR STRIP.AUTO-MCNC: ABNORMAL MG/DL
LEUKOCYTE ESTERASE UR QL STRIP.AUTO: NEGATIVE
NITRITE UR QL STRIP.AUTO: NEGATIVE
PH UR STRIP.AUTO: 6 [PH]
PROT UR STRIP.AUTO-MCNC: NEGATIVE MG/DL
RBC # UR STRIP.AUTO: NEGATIVE /UL
SP GR UR STRIP.AUTO: 1.02
UROBILINOGEN UR STRIP.AUTO-MCNC: <2 MG/DL
VANCOMYCIN SERPL-MCNC: 8 UG/ML (ref 5–20)
VANCOMYCIN SERPL-MCNC: 9.3 UG/ML (ref 5–20)

## 2024-01-10 PROCEDURE — 99223 1ST HOSP IP/OBS HIGH 75: CPT | Performed by: STUDENT IN AN ORGANIZED HEALTH CARE EDUCATION/TRAINING PROGRAM

## 2024-01-10 PROCEDURE — 81003 URINALYSIS AUTO W/O SCOPE: CPT | Performed by: PHYSICIAN ASSISTANT

## 2024-01-10 PROCEDURE — 1100000001 HC PRIVATE ROOM DAILY

## 2024-01-10 PROCEDURE — 86320 SERUM IMMUNOELECTROPHORESIS: CPT | Performed by: INTERNAL MEDICINE

## 2024-01-10 PROCEDURE — 82947 ASSAY GLUCOSE BLOOD QUANT: CPT

## 2024-01-10 PROCEDURE — 84165 PROTEIN E-PHORESIS SERUM: CPT | Mod: ELYLAB | Performed by: INTERNAL MEDICINE

## 2024-01-10 PROCEDURE — 2500000001 HC RX 250 WO HCPCS SELF ADMINISTERED DRUGS (ALT 637 FOR MEDICARE OP): Performed by: INTERNAL MEDICINE

## 2024-01-10 PROCEDURE — 36415 COLL VENOUS BLD VENIPUNCTURE: CPT | Performed by: PHYSICIAN ASSISTANT

## 2024-01-10 PROCEDURE — 2500000004 HC RX 250 GENERAL PHARMACY W/ HCPCS (ALT 636 FOR OP/ED): Performed by: INTERNAL MEDICINE

## 2024-01-10 PROCEDURE — 87040 BLOOD CULTURE FOR BACTERIA: CPT | Mod: ELYLAB | Performed by: PHYSICIAN ASSISTANT

## 2024-01-10 PROCEDURE — 80202 ASSAY OF VANCOMYCIN: CPT

## 2024-01-10 PROCEDURE — 2500000002 HC RX 250 W HCPCS SELF ADMINISTERED DRUGS (ALT 637 FOR MEDICARE OP, ALT 636 FOR OP/ED): Performed by: INTERNAL MEDICINE

## 2024-01-10 PROCEDURE — 2500000004 HC RX 250 GENERAL PHARMACY W/ HCPCS (ALT 636 FOR OP/ED): Performed by: PHYSICIAN ASSISTANT

## 2024-01-10 PROCEDURE — 99223 1ST HOSP IP/OBS HIGH 75: CPT | Performed by: INTERNAL MEDICINE

## 2024-01-10 PROCEDURE — 84165 PROTEIN E-PHORESIS SERUM: CPT | Performed by: INTERNAL MEDICINE

## 2024-01-10 RX ORDER — TALC
3 POWDER (GRAM) TOPICAL NIGHTLY PRN
Status: DISCONTINUED | OUTPATIENT
Start: 2024-01-10 | End: 2024-01-11 | Stop reason: HOSPADM

## 2024-01-10 RX ORDER — LOSARTAN POTASSIUM 50 MG/1
1 TABLET ORAL 2 TIMES DAILY
COMMUNITY
Start: 2023-11-07 | End: 2024-03-25

## 2024-01-10 RX ORDER — POLYETHYLENE GLYCOL 3350 17 G/17G
17 POWDER, FOR SOLUTION ORAL DAILY PRN
Status: DISCONTINUED | OUTPATIENT
Start: 2024-01-10 | End: 2024-01-11 | Stop reason: HOSPADM

## 2024-01-10 RX ORDER — CARVEDILOL 6.25 MG/1
6.25 TABLET ORAL 2 TIMES DAILY
Status: DISCONTINUED | OUTPATIENT
Start: 2024-01-10 | End: 2024-01-11 | Stop reason: HOSPADM

## 2024-01-10 RX ORDER — ONDANSETRON HYDROCHLORIDE 2 MG/ML
4 INJECTION, SOLUTION INTRAVENOUS EVERY 8 HOURS PRN
Status: DISCONTINUED | OUTPATIENT
Start: 2024-01-10 | End: 2024-01-11 | Stop reason: HOSPADM

## 2024-01-10 RX ORDER — CYCLOBENZAPRINE HCL 5 MG
10 TABLET ORAL 3 TIMES DAILY PRN
COMMUNITY
Start: 2023-12-26 | End: 2024-03-25

## 2024-01-10 RX ORDER — AMLODIPINE BESYLATE 5 MG/1
10 TABLET ORAL DAILY
Status: DISCONTINUED | OUTPATIENT
Start: 2024-01-10 | End: 2024-01-11 | Stop reason: HOSPADM

## 2024-01-10 RX ORDER — LANOLIN ALCOHOL/MO/W.PET/CERES
1 CREAM (GRAM) TOPICAL DAILY
COMMUNITY
Start: 2023-12-26

## 2024-01-10 RX ORDER — CARVEDILOL 6.25 MG/1
1 TABLET ORAL 2 TIMES DAILY
COMMUNITY
Start: 2023-12-26 | End: 2024-03-25

## 2024-01-10 RX ORDER — GEMFIBROZIL 600 MG/1
600 TABLET, FILM COATED ORAL 2 TIMES DAILY
COMMUNITY
Start: 2009-04-13

## 2024-01-10 RX ORDER — DEXTROSE 50 % IN WATER (D50W) INTRAVENOUS SYRINGE
25
Status: DISCONTINUED | OUTPATIENT
Start: 2024-01-10 | End: 2024-01-11 | Stop reason: HOSPADM

## 2024-01-10 RX ORDER — GEMFIBROZIL 600 MG/1
600 TABLET, FILM COATED ORAL
Status: DISCONTINUED | OUTPATIENT
Start: 2024-01-10 | End: 2024-01-11 | Stop reason: HOSPADM

## 2024-01-10 RX ORDER — CHLORTHALIDONE 25 MG/1
25 TABLET ORAL DAILY
COMMUNITY
Start: 2023-12-26

## 2024-01-10 RX ORDER — ACETAMINOPHEN 160 MG/5ML
650 SOLUTION ORAL EVERY 4 HOURS PRN
Status: DISCONTINUED | OUTPATIENT
Start: 2024-01-10 | End: 2024-01-11 | Stop reason: HOSPADM

## 2024-01-10 RX ORDER — ONDANSETRON 4 MG/1
4 TABLET, FILM COATED ORAL EVERY 8 HOURS PRN
Status: DISCONTINUED | OUTPATIENT
Start: 2024-01-10 | End: 2024-01-11 | Stop reason: HOSPADM

## 2024-01-10 RX ORDER — INSULIN LISPRO 100 [IU]/ML
0-15 INJECTION, SOLUTION INTRAVENOUS; SUBCUTANEOUS
Status: DISCONTINUED | OUTPATIENT
Start: 2024-01-10 | End: 2024-01-11 | Stop reason: HOSPADM

## 2024-01-10 RX ORDER — AMLODIPINE BESYLATE 10 MG/1
1 TABLET ORAL DAILY
COMMUNITY
Start: 2009-04-13 | End: 2024-03-25

## 2024-01-10 RX ORDER — ASPIRIN 81 MG/1
81 TABLET ORAL DAILY
COMMUNITY
Start: 2009-04-13

## 2024-01-10 RX ORDER — ATORVASTATIN CALCIUM 80 MG/1
40 TABLET, FILM COATED ORAL DAILY
COMMUNITY

## 2024-01-10 RX ORDER — DEXTROSE MONOHYDRATE 100 MG/ML
0.3 INJECTION, SOLUTION INTRAVENOUS ONCE AS NEEDED
Status: DISCONTINUED | OUTPATIENT
Start: 2024-01-10 | End: 2024-01-11 | Stop reason: HOSPADM

## 2024-01-10 RX ORDER — PANTOPRAZOLE SODIUM 40 MG/1
40 TABLET, DELAYED RELEASE ORAL DAILY
Status: DISCONTINUED | OUTPATIENT
Start: 2024-01-10 | End: 2024-01-11 | Stop reason: HOSPADM

## 2024-01-10 RX ORDER — VANCOMYCIN HYDROCHLORIDE 1 G/200ML
1000 INJECTION, SOLUTION INTRAVENOUS ONCE
Status: COMPLETED | OUTPATIENT
Start: 2024-01-10 | End: 2024-01-10

## 2024-01-10 RX ORDER — DULOXETIN HYDROCHLORIDE 30 MG/1
1 CAPSULE, DELAYED RELEASE ORAL DAILY
COMMUNITY
Start: 2023-12-26

## 2024-01-10 RX ORDER — MORPHINE SULFATE 2 MG/ML
2 INJECTION, SOLUTION INTRAMUSCULAR; INTRAVENOUS EVERY 4 HOURS PRN
Status: DISCONTINUED | OUTPATIENT
Start: 2024-01-10 | End: 2024-01-11 | Stop reason: HOSPADM

## 2024-01-10 RX ORDER — ACETAMINOPHEN 325 MG/1
650 TABLET ORAL EVERY 4 HOURS PRN
COMMUNITY
Start: 2023-12-26 | End: 2024-12-26

## 2024-01-10 RX ORDER — ACETAMINOPHEN 325 MG/1
650 TABLET ORAL EVERY 4 HOURS PRN
Status: DISCONTINUED | OUTPATIENT
Start: 2024-01-10 | End: 2024-01-11 | Stop reason: HOSPADM

## 2024-01-10 RX ORDER — ASPIRIN 81 MG/1
81 TABLET ORAL DAILY
Status: DISCONTINUED | OUTPATIENT
Start: 2024-01-10 | End: 2024-01-11 | Stop reason: HOSPADM

## 2024-01-10 RX ORDER — GABAPENTIN 100 MG/1
100 CAPSULE ORAL 3 TIMES DAILY
Status: DISCONTINUED | OUTPATIENT
Start: 2024-01-10 | End: 2024-01-11 | Stop reason: HOSPADM

## 2024-01-10 RX ORDER — LOSARTAN POTASSIUM 50 MG/1
50 TABLET ORAL 2 TIMES DAILY
Status: DISCONTINUED | OUTPATIENT
Start: 2024-01-10 | End: 2024-01-11 | Stop reason: HOSPADM

## 2024-01-10 RX ORDER — ACETAMINOPHEN 650 MG/1
650 SUPPOSITORY RECTAL EVERY 4 HOURS PRN
Status: DISCONTINUED | OUTPATIENT
Start: 2024-01-10 | End: 2024-01-11 | Stop reason: HOSPADM

## 2024-01-10 RX ORDER — ATORVASTATIN CALCIUM 20 MG/1
40 TABLET, FILM COATED ORAL NIGHTLY
Status: DISCONTINUED | OUTPATIENT
Start: 2024-01-10 | End: 2024-01-11 | Stop reason: HOSPADM

## 2024-01-10 RX ORDER — GABAPENTIN 100 MG/1
1 CAPSULE ORAL 3 TIMES DAILY
COMMUNITY
Start: 2023-12-26

## 2024-01-10 RX ORDER — PANTOPRAZOLE SODIUM 40 MG/10ML
40 INJECTION, POWDER, LYOPHILIZED, FOR SOLUTION INTRAVENOUS DAILY
Status: DISCONTINUED | OUTPATIENT
Start: 2024-01-10 | End: 2024-01-11 | Stop reason: HOSPADM

## 2024-01-10 RX ORDER — ALOGLIPTIN 12.5 MG/1
1 TABLET, FILM COATED ORAL DAILY
COMMUNITY
Start: 2023-12-26 | End: 2024-03-25

## 2024-01-10 RX ORDER — HYDROCODONE BITARTRATE AND ACETAMINOPHEN 5; 325 MG/1; MG/1
2 TABLET ORAL EVERY 6 HOURS PRN
Status: DISCONTINUED | OUTPATIENT
Start: 2024-01-10 | End: 2024-01-11 | Stop reason: HOSPADM

## 2024-01-10 RX ADMIN — GABAPENTIN 100 MG: 100 CAPSULE ORAL at 14:53

## 2024-01-10 RX ADMIN — GABAPENTIN 100 MG: 100 CAPSULE ORAL at 21:10

## 2024-01-10 RX ADMIN — ASPIRIN 81 MG: 81 TABLET, COATED ORAL at 08:44

## 2024-01-10 RX ADMIN — ATORVASTATIN CALCIUM 40 MG: 20 TABLET, FILM COATED ORAL at 21:10

## 2024-01-10 RX ADMIN — AMLODIPINE BESYLATE 10 MG: 5 TABLET ORAL at 08:43

## 2024-01-10 RX ADMIN — VANCOMYCIN HYDROCHLORIDE 1000 MG: 1 INJECTION, SOLUTION INTRAVENOUS at 01:48

## 2024-01-10 RX ADMIN — INSULIN LISPRO 6 UNITS: 100 INJECTION, SOLUTION INTRAVENOUS; SUBCUTANEOUS at 17:53

## 2024-01-10 RX ADMIN — GEMFIBROZIL 600 MG: 600 TABLET ORAL at 17:53

## 2024-01-10 RX ADMIN — CARVEDILOL 6.25 MG: 6.25 TABLET, FILM COATED ORAL at 08:43

## 2024-01-10 RX ADMIN — CARVEDILOL 6.25 MG: 6.25 TABLET, FILM COATED ORAL at 21:10

## 2024-01-10 RX ADMIN — CEFEPIME 2 G: 2 INJECTION, POWDER, FOR SOLUTION INTRAVENOUS at 02:07

## 2024-01-10 RX ADMIN — GEMFIBROZIL 600 MG: 600 TABLET ORAL at 08:44

## 2024-01-10 RX ADMIN — LOSARTAN POTASSIUM 50 MG: 50 TABLET, FILM COATED ORAL at 08:44

## 2024-01-10 RX ADMIN — GABAPENTIN 100 MG: 100 CAPSULE ORAL at 08:44

## 2024-01-10 RX ADMIN — LOSARTAN POTASSIUM 50 MG: 50 TABLET, FILM COATED ORAL at 21:10

## 2024-01-10 RX ADMIN — INSULIN LISPRO 3 UNITS: 100 INJECTION, SOLUTION INTRAVENOUS; SUBCUTANEOUS at 11:55

## 2024-01-10 RX ADMIN — INSULIN LISPRO 2 UNITS: 100 INJECTION, SOLUTION INTRAVENOUS; SUBCUTANEOUS at 08:44

## 2024-01-10 RX ADMIN — PANTOPRAZOLE SODIUM 40 MG: 40 TABLET, DELAYED RELEASE ORAL at 05:19

## 2024-01-10 RX ADMIN — CEFEPIME 2 G: 2 INJECTION, POWDER, FOR SOLUTION INTRAVENOUS at 14:53

## 2024-01-10 SDOH — SOCIAL STABILITY: SOCIAL INSECURITY: HAVE YOU HAD THOUGHTS OF HARMING ANYONE ELSE?: NO

## 2024-01-10 SDOH — SOCIAL STABILITY: SOCIAL INSECURITY: ARE YOU OR HAVE YOU BEEN THREATENED OR ABUSED PHYSICALLY, EMOTIONALLY, OR SEXUALLY BY ANYONE?: NO

## 2024-01-10 SDOH — SOCIAL STABILITY: SOCIAL INSECURITY: DOES ANYONE TRY TO KEEP YOU FROM HAVING/CONTACTING OTHER FRIENDS OR DOING THINGS OUTSIDE YOUR HOME?: NO

## 2024-01-10 SDOH — SOCIAL STABILITY: SOCIAL INSECURITY: DO YOU FEEL ANYONE HAS EXPLOITED OR TAKEN ADVANTAGE OF YOU FINANCIALLY OR OF YOUR PERSONAL PROPERTY?: NO

## 2024-01-10 SDOH — SOCIAL STABILITY: SOCIAL INSECURITY: ABUSE: ADULT

## 2024-01-10 SDOH — SOCIAL STABILITY: SOCIAL INSECURITY: HAS ANYONE EVER THREATENED TO HURT YOUR FAMILY OR YOUR PETS?: NO

## 2024-01-10 SDOH — SOCIAL STABILITY: SOCIAL INSECURITY: ARE THERE ANY APPARENT SIGNS OF INJURIES/BEHAVIORS THAT COULD BE RELATED TO ABUSE/NEGLECT?: NO

## 2024-01-10 SDOH — SOCIAL STABILITY: SOCIAL INSECURITY: DO YOU FEEL UNSAFE GOING BACK TO THE PLACE WHERE YOU ARE LIVING?: NO

## 2024-01-10 SDOH — SOCIAL STABILITY: SOCIAL INSECURITY: WERE YOU ABLE TO COMPLETE ALL THE BEHAVIORAL HEALTH SCREENINGS?: YES

## 2024-01-10 ASSESSMENT — ACTIVITIES OF DAILY LIVING (ADL)
BATHING: INDEPENDENT
GROOMING: INDEPENDENT
WALKS IN HOME: INDEPENDENT
LACK_OF_TRANSPORTATION: NO
PATIENT'S MEMORY ADEQUATE TO SAFELY COMPLETE DAILY ACTIVITIES?: YES
HEARING - LEFT EAR: FUNCTIONAL
DRESSING YOURSELF: INDEPENDENT
LACK_OF_TRANSPORTATION: NO
FEEDING YOURSELF: INDEPENDENT
TOILETING: INDEPENDENT
HEARING - RIGHT EAR: FUNCTIONAL
JUDGMENT_ADEQUATE_SAFELY_COMPLETE_DAILY_ACTIVITIES: YES
ADEQUATE_TO_COMPLETE_ADL: YES

## 2024-01-10 ASSESSMENT — LIFESTYLE VARIABLES
HOW OFTEN DO YOU HAVE A DRINK CONTAINING ALCOHOL: NEVER
SKIP TO QUESTIONS 9-10: 1
HOW MANY STANDARD DRINKS CONTAINING ALCOHOL DO YOU HAVE ON A TYPICAL DAY: PATIENT DOES NOT DRINK
HOW OFTEN DO YOU HAVE 6 OR MORE DRINKS ON ONE OCCASION: NEVER
AUDIT-C TOTAL SCORE: 0
AUDIT-C TOTAL SCORE: 0

## 2024-01-10 ASSESSMENT — COGNITIVE AND FUNCTIONAL STATUS - GENERAL
TURNING FROM BACK TO SIDE WHILE IN FLAT BAD: A LITTLE
PATIENT BASELINE BEDBOUND: NO
MOBILITY SCORE: 18
DRESSING REGULAR UPPER BODY CLOTHING: A LITTLE
TURNING FROM BACK TO SIDE WHILE IN FLAT BAD: A LITTLE
MOBILITY SCORE: 17
DRESSING REGULAR LOWER BODY CLOTHING: A LITTLE
DAILY ACTIVITIY SCORE: 20
DRESSING REGULAR LOWER BODY CLOTHING: A LITTLE
PERSONAL GROOMING: A LITTLE
TURNING FROM BACK TO SIDE WHILE IN FLAT BAD: A LITTLE
DAILY ACTIVITIY SCORE: 19
TOILETING: A LITTLE
DAILY ACTIVITIY SCORE: 19
CLIMB 3 TO 5 STEPS WITH RAILING: A LOT
CLIMB 3 TO 5 STEPS WITH RAILING: A LOT
DRESSING REGULAR UPPER BODY CLOTHING: A LITTLE
STANDING UP FROM CHAIR USING ARMS: A LITTLE
TOILETING: A LITTLE
WALKING IN HOSPITAL ROOM: A LITTLE
STANDING UP FROM CHAIR USING ARMS: A LITTLE
TOILETING: A LITTLE
MOVING TO AND FROM BED TO CHAIR: A LITTLE
DRESSING REGULAR UPPER BODY CLOTHING: A LITTLE
MOVING TO AND FROM BED TO CHAIR: A LITTLE
MOVING TO AND FROM BED TO CHAIR: A LITTLE
WALKING IN HOSPITAL ROOM: A LOT
CLIMB 3 TO 5 STEPS WITH RAILING: A LOT
WALKING IN HOSPITAL ROOM: A LITTLE
MOBILITY SCORE: 18
HELP NEEDED FOR BATHING: A LITTLE
PERSONAL GROOMING: A LITTLE
HELP NEEDED FOR BATHING: A LITTLE
HELP NEEDED FOR BATHING: A LITTLE
STANDING UP FROM CHAIR USING ARMS: A LITTLE
DRESSING REGULAR LOWER BODY CLOTHING: A LITTLE

## 2024-01-10 ASSESSMENT — PAIN SCALES - GENERAL
PAINLEVEL_OUTOF10: 0 - NO PAIN
PAINLEVEL_OUTOF10: 0 - NO PAIN

## 2024-01-10 ASSESSMENT — PATIENT HEALTH QUESTIONNAIRE - PHQ9
1. LITTLE INTEREST OR PLEASURE IN DOING THINGS: NOT AT ALL
2. FEELING DOWN, DEPRESSED OR HOPELESS: NOT AT ALL
SUM OF ALL RESPONSES TO PHQ9 QUESTIONS 1 & 2: 0

## 2024-01-10 ASSESSMENT — PAIN - FUNCTIONAL ASSESSMENT
PAIN_FUNCTIONAL_ASSESSMENT: 0-10
PAIN_FUNCTIONAL_ASSESSMENT: 0-10

## 2024-01-10 NOTE — PROGRESS NOTES
"Vancomycin Dosing by Pharmacy- INITIAL    Ant Alcala is a 83 y.o. year old male who Pharmacy has been consulted for vancomycin dosing for sepsis/osteomyelitis/septic arthritis. Based on the patient's indication and renal status this patient will be dosed based on a goal AUC of 400-600.     Renal function is currently stable.    Visit Vitals  /64 (BP Location: Left arm)   Pulse 69   Temp 36.8 °C (98.2 °F) (Temporal)   Resp 14        Lab Results   Component Value Date    CREATININE 1.25 01/09/2024    CREATININE 0.99 09/03/2023    CREATININE 0.98 09/01/2023    CREATININE 1.01 08/06/2019        Patient weight is 79.4kg.    No results found for: \"CULTURE\"     No intake/output data recorded.  [unfilled]    No results found for: \"PATIENTTEMP\"       Assessment/Plan     Patient has already been given a loading dose of 1000 mg on 1/10/24 @0148 in ER.  Will initiate vancomycin maintenance,  1250 mg every 24 hours.    This dosing regimen is predicted by [employer] to result in the following pharmacokinetic parameters:  Regimen: 1250 mg IV every 24 hours.  Start time: 13:48 on 01/10/2024  Exposure target: AUC24 (range)400-600 mg/L.hr   AUC24,ss: 521 mg/L.hr  Probability of AUC24 > 400: 79 %  Ctrough,ss: 16 mg/L  Probability of Ctrough,ss > 20: 29 %  Probability of nephrotoxicity (Lodise SYLVESTER 2009): 11 %      Follow-up level will be ordered on 1/10/14 at 1000 and 1400 unless clinically indicated sooner.  Will continue to monitor renal function daily while on vancomycin and order serum creatinine at least every 48 hours if not already ordered.  Follow for continued vancomycin needs, clinical response, and signs/symptoms of toxicity.       ENRIQUE TOSCANO, PharmD       "

## 2024-01-10 NOTE — NURSING NOTE
Patient arrived to room 619 via stretcher accompanied by ED staff. Pt denies SOB, chills, pain, and nausea at this time. IV WNL, All pts questions answered upon arrival to ortho unit. Med regime reviewed with patient and no issues found at this time. Pt oriented to room and call light. This nurse educated pt to avoid strenuous activity. Pt verbalized understanding. No acute distress. Initiated all orders. See orders in patients chart. Call light and belongings within reach.

## 2024-01-10 NOTE — PROGRESS NOTES
Dr Cornejo contacted [facility] transfer [place] to start transfer process [facility] this pt. [facility] requested [facility] be sent [facility] fax: [phone]. [facility] printed and sent clinicals at this time.

## 2024-01-10 NOTE — CARE PLAN
The clinical goals for the shift include maintain saftey    Problem: Fall/Injury  Goal: Not fall by end of shift  Outcome: Progressing  Goal: Be free from injury by end of the shift  Outcome: Progressing  Goal: Verbalize understanding of personal risk factors for fall in the hospital  Outcome: Progressing  Goal: Verbalize understanding of risk factor reduction measures to prevent injury from fall in the home  Outcome: Progressing  Goal: Use assistive devices by end of the shift  Outcome: Progressing  Goal: Pace activities to prevent fatigue by end of the shift  Outcome: Progressing     Problem: Pain - Adult  Goal: Verbalizes/displays adequate comfort level or baseline comfort level  Outcome: Progressing     Problem: Safety - Adult  Goal: Free from fall injury  Outcome: Progressing     Problem: Discharge Planning  Goal: Discharge to home or other facility with appropriate resources  Outcome: Progressing     Problem: Chronic Conditions and Co-morbidities  Goal: Patient's chronic conditions and co-morbidity symptoms are monitored and maintained or improved  Outcome: Progressing     Problem: Diabetes  Goal: Achieve decreasing blood glucose levels by end of shift  Outcome: Progressing  Goal: Increase stability of blood glucose readings by end of shift  Outcome: Progressing  Goal: Decrease in ketones present in urine by end of shift  Outcome: Progressing  Goal: Maintain electrolyte levels within acceptable range throughout shift  Outcome: Progressing  Goal: Maintain glucose levels >70mg/dl to <250mg/dl throughout shift  Outcome: Progressing  Goal: No changes in neurological exam by end of shift  Outcome: Progressing  Goal: Learn about and adhere to nutrition recommendations by end of shift  Outcome: Progressing  Goal: Vital signs within normal range for age by end of shift  Outcome: Progressing  Goal: Increase self care and/or family involovement by end of shift  Outcome: Progressing  Goal: Receive DSME education by end  of shift  Outcome: Progressing

## 2024-01-10 NOTE — PROGRESS NOTES
01/10/24 1052   Discharge Planning   Living Arrangements Spouse/significant other   Support Systems Spouse/significant other   Assistance Needed PTA lives with ind with walker but had dizziness and falls x 5 in 2 days PTA   Type of Residence Private residence  (1 level with basement (laundry- wife completes))   Number of Stairs to Enter Residence 3   Number of Stairs Within Residence 12  (with right side handrail)   Do you have animals or pets at home? Yes   Type of Animals or Pets 1 large 165 lb [place]   Home or Post Acute Services None   Patient expects to be discharged to: Home   Does the patient need discharge transport arranged? No   Financial Resource Strain   How hard is it for you to pay for the very basics like food, housing, medical care, and heating? Not hard   Housing Stability   In the last 12 months, was there a time when you were not able to pay the mortgage or rent on time? N   In the last 12 months, how many places have you lived? 1   In the last 12 months, was there a time when you did not have a steady place to sleep or slept in a shelter (including now)? N   Transportation Needs   In the past 12 months, has lack of transportation kept you from medical appointments or from getting medications? no   In the past 12 months, has lack of transportation kept you from meetings, work, or from getting things needed for daily living? No     Pts wife drove him to ED and presented with dizziness, multiple falls and back pain. Pt states was recently discharged on 12/29/23 from [facility] after spending 5 weeks there for lumbar spine infection IV antibiotics. [facility], was offered home care but states declined home care services. [facility].  ID is consulted.  Pt DC preference is unknown at this time, pt states that the Dr yesterday was talking about transferring him downtown. CT team will continue to follow case for progression and DC planning.   JAMES Funk also notified of this case.

## 2024-01-10 NOTE — CONSULTS
Consults  Referred by Dr Bhumi Orta MD: Amy Bravo MD    Reason For Consult  Lumbar discitis    History Of Present Illness  Ant Alcala is a 83 y.o. male with past medical history of spinal stenosis, diabetes mellitus type 2 hyperlipidemia and hypertension macular degeneration and diabetic retinopathy and peripheral neuropathy, recent hospitalization at the [facility] for abnormal lumbar MRI for L3-L4 discitis in a patient with acute on chronic low back pain, status post CT-guided disc aspiration with negative culture, was treated with IV vancomycin and cefepime.  Took his IV antibiotics that was started on November 17 till December 29 with PICC removal.    patient was admitted with dizziness, frequent falls and low back pain worsening.  Patient was becoming dizzy when standing up using the walker.  Patient reported to be very weak in both legs and upper extremities.   On admission patient was found to have a hyperglycemia with a glucose level of 320, mild lactic acidosis, elevated sed rate and CRP that were normal 2 weeks ago.  CT of the head on admission was negative  MRI of the spine showed moderate central canal stenosis at C3-6 6 with severe foraminal stenosis.   Schwannoma at T12-L1 level  Discitis and osteomyelitis at L3-L4, right greater than left paravertebral psoas muscle edema related to discitis, nondisplaced subacute fracture of left L3 transverse process    Past medical history is as detailed in my note  Surgical History  He has a past surgical history that includes US guided biopsy prostate (8/21/2019).  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  Allergies  Patient has no known allergies.       There is no immunization history on file for this patient.  Medications  Home medications:  Medications Prior to Admission   Medication Sig Dispense Refill Last Dose    acetaminophen (Tylenol) 325 mg tablet Take 2 tablets (650 mg) by mouth every 4 hours if needed for mild pain (1 - 3) or fever (temp greater than 38.0 C).       alogliptin (Nesina) 12.5 mg tablet Take 1 tablet (12.5 mg) by mouth once daily.       amLODIPine (Norvasc) 10 mg tablet Take 1 tablet (10 mg) by mouth once daily.       aspirin 81 mg EC tablet Take 1 tablet (81 mg) by mouth once daily.       carvedilol (Coreg) 6.25 mg tablet Take 1 tablet (6.25 mg) by mouth 2 times a day.       chlorthalidone (Hygroton) 25 mg tablet Take 1 tablet (25 mg) by mouth once daily.       cyanocobalamin (Vitamin B-12) 1,000 mcg tablet Take 1 tablet (1,000 mcg) by mouth once daily.       cyclobenzaprine (Flexeril) 5 mg tablet Take 2 tablets (10 mg) by mouth 3 times a day as needed for muscle spasms.       DULoxetine (Cymbalta) 30 mg DR capsule Take 1 capsule (30 mg) by mouth once daily.       gabapentin (Neurontin) 100 mg capsule Take 1 capsule (100 mg) by mouth 3 times a day.       gemfibrozil (Lopid) 600 mg tablet Take 1 tablet (600 mg) by mouth 2 times a day.       losartan (Cozaar) 50 mg tablet Take 1 tablet (50 mg) by mouth 2 times a day.       atorvastatin (Lipitor) 80 mg tablet Take 0.5 tablets (40 mg) by mouth once daily.       lidocaine (Lidoderm) 5 % patch Place 1 patch over 12 hours on the skin once daily. Remove & discard patch within 12 hours or as directed by MD. 10 patch 0     lidocaine 4 % dressing Apply topically if needed for mild pain (1 - 3). 10 each 0      Current medications:  Scheduled medications  amLODIPine, 10 mg, oral, Daily  aspirin, 81 mg, oral,  "Daily  atorvastatin, 40 mg, oral, Nightly  carvedilol, 6.25 mg, oral, BID  cefepime, 2 g, intravenous, q12h  gabapentin, 100 mg, oral, TID  gemfibrozil, 600 mg, oral, BID AC  insulin lispro, 0-15 Units, subcutaneous, TID with meals  losartan, 50 mg, oral, BID  pantoprazole, 40 mg, oral, Daily   Or  pantoprazole, 40 mg, intravenous, Daily  [START ON 1/11/2024] vancomycin, 1,250 mg, intravenous, q24h      Continuous medications     PRN medications  PRN medications: acetaminophen **OR** acetaminophen **OR** acetaminophen, dextrose 10 % in water (D10W), dextrose, glucagon, HYDROcodone-acetaminophen, melatonin, morphine, ondansetron **OR** ondansetron, polyethylene glycol    Review of Systems  Severe low back pain, generalized weakness, lightheadedness  Objective  Range of Vitals (last 24 hours)  Heart Rate:  [69-80]   Temp:  [36.4 °C (97.5 °F)-37.4 °C (99.3 °F)]   Resp:  [14-20]   BP: (112-150)/(59-78)   Height:  [172.7 cm (5' 8\")]   Weight:  [79.4 kg (175 lb)]   SpO2:  [90 %-98 %]   Daily Weight  01/09/24 : 79.4 kg (175 lb)    Body mass index is 26.61 kg/m².     Physical Exam  Vitals:    01/10/24 0334 01/10/24 0421 01/10/24 0726 01/10/24 1057   BP: 141/63 150/70 133/60 128/60   BP Location: Left arm  Right arm Right arm   Patient Position:   Lying Lying   Pulse: 70 69 72 76   Resp: 15 16 14 20   Temp: 36.5 °C (97.7 °F) 36.4 °C (97.5 °F) 37.4 °C (99.3 °F) 36.4 °C (97.5 °F)   TempSrc: Temporal  Temporal Temporal   SpO2: 94% 96% 90% 94%   Weight:       Height:         General Appearance: alert and oriented to person, place and time, well-developed and well-nourished, in no acute distress  Skin: warm and dry, no rash.   Head: normocephalic and atraumatic  Eyes: anicteric sclerae  ENT:  normal mucous membranes. No oral thrush  Lungs: normal respiratory effort, clear lungs  Heart normal S1-S2 no murmur  Abdomen: soft, no tenderness, no megaly  No leg edema  No erythema, no tenderness     Labs  Results from last 72 hours "   Lab Units 01/09/24  1823   WBC AUTO x10*3/uL 10.9   HEMOGLOBIN g/dL 10.8*   HEMATOCRIT % 32.0*   PLATELETS AUTO x10*3/uL 504*   NEUTROS PCT AUTO % 66.9   LYMPHS PCT AUTO % 15.5   MONOS PCT AUTO % 14.8   EOS PCT AUTO % 1.7     Results from last 72 hours   Lab Units 01/09/24  1823   SODIUM mmol/L 131*   POTASSIUM mmol/L 3.9   CHLORIDE mmol/L 94*   CO2 mmol/L 25   BUN mg/dL 42*   CREATININE mg/dL 1.25   GLUCOSE mg/dL 320*   CALCIUM mg/dL 9.1   ANION GAP mmol/L 16   EGFR mL/min/1.73m*2 58*     Results from last 72 hours   Lab Units 01/09/24  1823   ALK PHOS U/L 56   BILIRUBIN TOTAL mg/dL 0.4   PROTEIN TOTAL g/dL 6.7   ALT U/L 11   AST U/L 13   ALBUMIN g/dL 3.8     Estimated Creatinine Clearance: 44.8 mL/min (by C-G formula based on SCr of 1.25 mg/dL).  C-Reactive Protein   Date Value Ref Range Status   01/09/2024 7.25 (H) <1.00 mg/dL Final     Sedimentation Rate   Date Value Ref Range Status   01/09/2024 37 (H) 0 - 20 mm/h Final   [facility] from [facility] were reviewed    Current imaging were reviewed  MR lumbar spine w and wo IV contrast    Result Date: 1/9/2024  Interpreted By:  Fede Holcomb, STUDY: MR CERVICAL SPINE W AND WO IV CONTRAST; MR THORACIC SPINE W AND WO IV CONTRAST; MR LUMBAR SPINE W AND WO IV CONTRAST;  1/9/2024 10:06 pm   INDICATION: Signs/Symptoms:recent spinal infection worsening back pain leg weakness.   COMPARISON: CT cervical and thoracic and lumbar spine dated 01/09/2024.  MR lumbar spine dated 09/05/2023.   ACCESSION NUMBER(S): [id number]; [id number]; [id number]   ORDERING CLINICIAN: GERSON BRYANT   TECHNIQUE: Pre and post contrast multiplanar and multisequence images were acquired through the cervical spine, thoracic spine, and lumbar spine. Post contrast images were obtained after administration of 16 mL of Dotarem intravenous contrast.   FINDINGS:   Cervical spine:   Alignment: There is mild degenerative C3 over C4 retrolisthesis and C5 over C6.   Vertebrae/Intervertebral Discs:  The vertebral body heights are maintained. There is mildly heterogeneous bone marrow signal without evidence of focal suspicious osseous lesions. There is diffuse degenerative disc space narrowing from C3 through C7.   There are no epidural collections.   Cord: Normal spinal cord signal and contour.   C1-C2: The cervicomedullary junction appears unremarkable. There is no central canal stenosis.   C2-C3: Small disc bulge, contributing to partial effacement of the thecal sac. No foraminal stenosis.   C3-C4: Central disc osteophyte complex, mild retrolisthesis, with posterolateral osseous spurring results in complete effacement of the thecal sac with moderate central canal stenosis and moderate bilateral foraminal stenosis.   C4-C5: Mild central disc osteophyte complex and ligamentum flavum infolding contributes to mild central canal stenosis and mild bilateral foraminal stenosis.   C5-C6: There is a central disc osteophyte complex algorithm flavum infolding resulting in moderate central canal stenosis with almost complete effacement of the thecal sac. There is severe left and moderate right foraminal stenosis due to posterolateral osseous spurring.   C6-C7: Small disc osteophyte complex. No significant central canal stenosis. There is moderate bilateral foraminal stenosis due to posterolateral osseous spurring.   C7-T1: There is no significant disc bulge or herniation. There is no significant central canal or neural foraminal stenosis.   Thoracic spine: Normal thoracic kyphosis. The spinal cord is normal in signal and contour. Vertebral body heights  are maintained. There is multifocal fatty bone marrow change in the upper and midthoracic spine without focal bone marrow lesions. There is also multilevel degenerative disc space narrowing in the upper and midthoracic spine.   There are small central disc osteophyte complexes at T2-T3, T3-T4, T6-T7 contributing to mild central canal stenosis at these levels. There is  multilevel degenerative disc space narrowing in the midthoracic spine. There is no evidence of high-grade canal stenosis, spinal cord signal or contour abnormality in the thoracic spine. No evidence of abnormal enhancement within the spinal canal.   Lumbar spine: There is straightening of lumbar lordosis. Minimal degenerative anterolisthesis of L4 over L5, and degenerative retrolisthesis of L2 over L3 similar to prior. There is interval development of extensive bone marrow edema and intra discal edema at L3-L4 since September 2022. There is an enhancing rounded Schmorl node within the central inferior endplate of L3 extending into the disc space. There is focal enhancement and edema of the left L3 transverse process, axial image 22 of 46 consistent with a nondisplaced fracture.   There is focal T1 hypointense enhancing bone marrow lesion within the right iliac bone unchanged from prior, axial image 40 of 46, which is nonspecific and may represent an atypical hemangioma, axial image 40 of 46.   Previously seen T1 hypointense T2 hyperintense nodule within the right T12-L1 neural foramen is unchanged in size and demonstrates diffuse enhancement, measuring 1.1 x 0.7 cm, likely representing a nerve root schwannoma.   T12-L2: No significant disc bulge or herniation. No significant central canal or foraminal stenosis. Note there is an enhancing homogeneous nodule within the right T12-L1 neural foramen consistent with a schwannoma. L2-L3: Mild retrolisthesis of L3 over L4 and facet hypertrophy without central canal stenosis. There is mild-to-moderate bilateral foraminal stenosis due to osseous spurring similar to prior. L3-L4: There is ventral epidural enhancement posterior to the L3 and L4 vertebral bodies which may represent reactive venous epidural plexus versus phlegmon. There is also enhancing disc material in the ventral epidural space measuring approximately 1.0 cm craniocaudally and point 6 cm in anterior-posterior  dimension, sagittal image 10 of 19. This contributes to mild left subarticular stenosis without significant central canal stenosis. There is moderate to severe lbilateral foraminal stenosis due to progressing disc space narrowing with disc bulge and facet hypertrophy. There is increasing left foraminal disc protrusion. There is mild enhancement of the bilateral facets with small right-sided facet effusion, unchanged from September 2023, likely degenerative. L4-L5: There is similar moderate right greater than left foraminal stenosis due to facet hypertrophy and disc bulge. There is enhancing right-sided facet effusion, likely degenerative similar to prior. L5-S1: No significant disc bulge or herniation. No significant canal stenosis.     Other:         Cervical spine: No evidence of focal suspicious bone marrow lesions or discitis osteomyelitis in the cervical spine. Multilevel uncal and synovial facet arthropathy in the cervical spine with resultant moderate central canal stenosis at C3-C4 and C5-C6. There is severe left foraminal stenosis at C6-C7 due to posterolateral osseous spurring.   Thoracic spine: Endplate degenerative change throughout the upper and midthoracic spine without high-grade canal stenosis, bone marrow edema, or spinal cord abnormality.   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 There is posterior central enhancing soft tissue at the level of L3-L4 consistent with phlegmonous change, without evidence of significant central canal stenosis at this level or epidural abscess. There is an enhancing Schmorl node within the inferior endplate of L3 with extension into the disc space. There is right greater than left paravertebral psoas muscle edema at the level of L3-L4 related to the discitis osteomyelitis, without evidence of a fluid collection. There is interval progression of bilateral foraminal stenosis at L3-L4 due to increased disc space narrowing with diffuse disc bulge and facet  hypertrophy.   Unchanged moderate degenerative bilateral foraminal stenosis at L4-L5. No evidence of significant central canal stenosis.   Previously seen enhancing soft tissue within the right T12-L1 neural foramen is unchanged in size and demonstrates homogeneous enhancement consistent with a schwannoma. There is otherwise no abnormal leptomeningeal enhancement. Nonspecific unchanged in size enhancing bone marrow lesion within the right posterior ilium which may possibly represent an atypical hemangioma. Attention on follow-up is recommended.   Nondisplaced subacute fracture of the left L3 transverse process.   Signed by: Fede Holcomb 1/9/2024 11:34 PM Dictation workstation:   [id number]    MR thoracic spine w and wo IV contrast    Result Date: 1/9/2024  Interpreted By:  Fede Holcomb, STUDY: MR CERVICAL SPINE W AND WO IV CONTRAST; MR THORACIC SPINE W AND WO IV CONTRAST; MR LUMBAR SPINE W AND WO IV CONTRAST;  1/9/2024 10:06 pm   INDICATION: Signs/Symptoms:recent spinal infection worsening back pain leg weakness.   COMPARISON: CT cervical and thoracic and lumbar spine dated 01/09/2024.  MR lumbar spine dated 09/05/2023.   ACCESSION NUMBER(S): [id number]; [id number]; [id number]   ORDERING CLINICIAN: GERSON BRYANT   TECHNIQUE: Pre and post contrast multiplanar and multisequence images were acquired through the cervical spine, thoracic spine, and lumbar spine. Post contrast images were obtained after administration of 16 mL of Dotarem intravenous contrast.   FINDINGS:   Cervical spine:   Alignment: There is mild degenerative C3 over C4 retrolisthesis and C5 over C6.   Vertebrae/Intervertebral Discs: The vertebral body heights are maintained. There is mildly heterogeneous bone marrow signal without evidence of focal suspicious osseous lesions. There is diffuse degenerative disc space narrowing from C3 through C7.   There are no epidural collections.   Cord: Normal spinal cord signal and contour.   C1-C2:  The cervicomedullary junction appears unremarkable. There is no central canal stenosis.   C2-C3: Small disc bulge, contributing to partial effacement of the thecal sac. No foraminal stenosis.   C3-C4: Central disc osteophyte complex, mild retrolisthesis, with posterolateral osseous spurring results in complete effacement of the thecal sac with moderate central canal stenosis and moderate bilateral foraminal stenosis.   C4-C5: Mild central disc osteophyte complex and ligamentum flavum infolding contributes to mild central canal stenosis and mild bilateral foraminal stenosis.   C5-C6: There is a central disc osteophyte complex algorithm flavum infolding resulting in moderate central canal stenosis with almost complete effacement of the thecal sac. There is severe left and moderate right foraminal stenosis due to posterolateral osseous spurring.   C6-C7: Small disc osteophyte complex. No significant central canal stenosis. There is moderate bilateral foraminal stenosis due to posterolateral osseous spurring.   C7-T1: There is no significant disc bulge or herniation. There is no significant central canal or neural foraminal stenosis.   Thoracic spine: Normal thoracic kyphosis. The spinal cord is normal in signal and contour. Vertebral body heights  are maintained. There is multifocal fatty bone marrow change in the upper and midthoracic spine without focal bone marrow lesions. There is also multilevel degenerative disc space narrowing in the upper and midthoracic spine.   There are small central disc osteophyte complexes at T2-T3, T3-T4, T6-T7 contributing to mild central canal stenosis at these levels. There is multilevel degenerative disc space narrowing in the midthoracic spine. There is no evidence of high-grade canal stenosis, spinal cord signal or contour abnormality in the thoracic spine. No evidence of abnormal enhancement within the spinal canal.   Lumbar spine: There is straightening of lumbar lordosis.  Minimal degenerative anterolisthesis of L4 over L5, and degenerative retrolisthesis of L2 over L3 similar to prior. There is interval development of extensive bone marrow edema and intra discal edema at L3-L4 since September 2022. There is an enhancing rounded Schmorl node within the central inferior endplate of L3 extending into the disc space. There is focal enhancement and edema of the left L3 transverse process, axial image 22 of 46 consistent with a nondisplaced fracture.   There is focal T1 hypointense enhancing bone marrow lesion within the right iliac bone unchanged from prior, axial image 40 of 46, which is nonspecific and may represent an atypical hemangioma, axial image 40 of 46.   Previously seen T1 hypointense T2 hyperintense nodule within the right T12-L1 neural foramen is unchanged in size and demonstrates diffuse enhancement, measuring 1.1 x 0.7 cm, likely representing a nerve root schwannoma.   T12-L2: No significant disc bulge or herniation. No significant central canal or foraminal stenosis. Note there is an enhancing homogeneous nodule within the right T12-L1 neural foramen consistent with a schwannoma. L2-L3: Mild retrolisthesis of L3 over L4 and facet hypertrophy without central canal stenosis. There is mild-to-moderate bilateral foraminal stenosis due to osseous spurring similar to prior. L3-L4: There is ventral epidural enhancement posterior to the L3 and L4 vertebral bodies which may represent reactive venous epidural plexus versus phlegmon. There is also enhancing disc material in the ventral epidural space measuring approximately 1.0 cm craniocaudally and point 6 cm in anterior-posterior dimension, sagittal image 10 of 19. This contributes to mild left subarticular stenosis without significant central canal stenosis. There is moderate to severe lbilateral foraminal stenosis due to progressing disc space narrowing with disc bulge and facet hypertrophy. There is increasing left foraminal  disc protrusion. There is mild enhancement of the bilateral facets with small right-sided facet effusion, unchanged from September 2023, likely degenerative. L4-L5: There is similar moderate right greater than left foraminal stenosis due to facet hypertrophy and disc bulge. There is enhancing right-sided facet effusion, likely degenerative similar to prior. L5-S1: No significant disc bulge or herniation. No significant canal stenosis.     Other:         Cervical spine: No evidence of focal suspicious bone marrow lesions or discitis osteomyelitis in the cervical spine. Multilevel uncal and synovial facet arthropathy in the cervical spine with resultant moderate central canal stenosis at C3-C4 and C5-C6. There is severe left foraminal stenosis at C6-C7 due to posterolateral osseous spurring.   Thoracic spine: Endplate degenerative change throughout the upper and midthoracic spine without high-grade canal stenosis, bone marrow edema, or spinal cord abnormality.   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 There is posterior central enhancing soft tissue at the level of L3-L4 consistent with phlegmonous change, without evidence of significant central canal stenosis at this level or epidural abscess. There is an enhancing Schmorl node within the inferior endplate of L3 with extension into the disc space. There is right greater than left paravertebral psoas muscle edema at the level of L3-L4 related to the discitis osteomyelitis, without evidence of a fluid collection. There is interval progression of bilateral foraminal stenosis at L3-L4 due to increased disc space narrowing with diffuse disc bulge and facet hypertrophy.   Unchanged moderate degenerative bilateral foraminal stenosis at L4-L5. No evidence of significant central canal stenosis.   Previously seen enhancing soft tissue within the right T12-L1 neural foramen is unchanged in size and demonstrates homogeneous enhancement consistent with a schwannoma. There  is otherwise no abnormal leptomeningeal enhancement. Nonspecific unchanged in size enhancing bone marrow lesion within the right posterior ilium which may possibly represent an atypical hemangioma. Attention on follow-up is recommended.   Nondisplaced subacute fracture of the left L3 transverse process.   Signed by: Fede Holcomb 1/9/2024 11:34 PM Dictation workstation:   [id number]    MR cervical spine w and wo IV contrast    Result Date: 1/9/2024  Interpreted By:  Fede Holcomb, STUDY: MR CERVICAL SPINE W AND WO IV CONTRAST; MR THORACIC SPINE W AND WO IV CONTRAST; MR LUMBAR SPINE W AND WO IV CONTRAST;  1/9/2024 10:06 pm   INDICATION: Signs/Symptoms:recent spinal infection worsening back pain leg weakness.   COMPARISON: CT cervical and thoracic and lumbar spine dated 01/09/2024.  MR lumbar spine dated 09/05/2023.   ACCESSION NUMBER(S): [id number]; [id number]; [id number]   ORDERING CLINICIAN: GERSON BRYANT   TECHNIQUE: Pre and post contrast multiplanar and multisequence images were acquired through the cervical spine, thoracic spine, and lumbar spine. Post contrast images were obtained after administration of 16 mL of Dotarem intravenous contrast.   FINDINGS:   Cervical spine:   Alignment: There is mild degenerative C3 over C4 retrolisthesis and C5 over C6.   Vertebrae/Intervertebral Discs: The vertebral body heights are maintained. There is mildly heterogeneous bone marrow signal without evidence of focal suspicious osseous lesions. There is diffuse degenerative disc space narrowing from C3 through C7.   There are no epidural collections.   Cord: Normal spinal cord signal and contour.   C1-C2: The cervicomedullary junction appears unremarkable. There is no central canal stenosis.   C2-C3: Small disc bulge, contributing to partial effacement of the thecal sac. No foraminal stenosis.   C3-C4: Central disc osteophyte complex, mild retrolisthesis, with posterolateral osseous spurring results in complete  effacement of the thecal sac with moderate central canal stenosis and moderate bilateral foraminal stenosis.   C4-C5: Mild central disc osteophyte complex and ligamentum flavum infolding contributes to mild central canal stenosis and mild bilateral foraminal stenosis.   C5-C6: There is a central disc osteophyte complex algorithm flavum infolding resulting in moderate central canal stenosis with almost complete effacement of the thecal sac. There is severe left and moderate right foraminal stenosis due to posterolateral osseous spurring.   C6-C7: Small disc osteophyte complex. No significant central canal stenosis. There is moderate bilateral foraminal stenosis due to posterolateral osseous spurring.   C7-T1: There is no significant disc bulge or herniation. There is no significant central canal or neural foraminal stenosis.   Thoracic spine: Normal thoracic kyphosis. The spinal cord is normal in signal and contour. Vertebral body heights  are maintained. There is multifocal fatty bone marrow change in the upper and midthoracic spine without focal bone marrow lesions. There is also multilevel degenerative disc space narrowing in the upper and midthoracic spine.   There are small central disc osteophyte complexes at T2-T3, T3-T4, T6-T7 contributing to mild central canal stenosis at these levels. There is multilevel degenerative disc space narrowing in the midthoracic spine. There is no evidence of high-grade canal stenosis, spinal cord signal or contour abnormality in the thoracic spine. No evidence of abnormal enhancement within the spinal canal.   Lumbar spine: There is straightening of lumbar lordosis. Minimal degenerative anterolisthesis of L4 over L5, and degenerative retrolisthesis of L2 over L3 similar to prior. There is interval development of extensive bone marrow edema and intra discal edema at L3-L4 since September 2022. There is an enhancing rounded Schmorl node within the central inferior endplate of L3  extending into the disc space. There is focal enhancement and edema of the left L3 transverse process, axial image 22 of 46 consistent with a nondisplaced fracture.   There is focal T1 hypointense enhancing bone marrow lesion within the right iliac bone unchanged from prior, axial image 40 of 46, which is nonspecific and may represent an atypical hemangioma, axial image 40 of 46.   Previously seen T1 hypointense T2 hyperintense nodule within the right T12-L1 neural foramen is unchanged in size and demonstrates diffuse enhancement, measuring 1.1 x 0.7 cm, likely representing a nerve root schwannoma.   T12-L2: No significant disc bulge or herniation. No significant central canal or foraminal stenosis. Note there is an enhancing homogeneous nodule within the right T12-L1 neural foramen consistent with a schwannoma. L2-L3: Mild retrolisthesis of L3 over L4 and facet hypertrophy without central canal stenosis. There is mild-to-moderate bilateral foraminal stenosis due to osseous spurring similar to prior. L3-L4: There is ventral epidural enhancement posterior to the L3 and L4 vertebral bodies which may represent reactive venous epidural plexus versus phlegmon. There is also enhancing disc material in the ventral epidural space measuring approximately 1.0 cm craniocaudally and point 6 cm in anterior-posterior dimension, sagittal image 10 of 19. This contributes to mild left subarticular stenosis without significant central canal stenosis. There is moderate to severe lbilateral foraminal stenosis due to progressing disc space narrowing with disc bulge and facet hypertrophy. There is increasing left foraminal disc protrusion. There is mild enhancement of the bilateral facets with small right-sided facet effusion, unchanged from September 2023, likely degenerative. L4-L5: There is similar moderate right greater than left foraminal stenosis due to facet hypertrophy and disc bulge. There is enhancing right-sided facet  effusion, likely degenerative similar to prior. L5-S1: No significant disc bulge or herniation. No significant canal stenosis.     Other:         Cervical spine: No evidence of focal suspicious bone marrow lesions or discitis osteomyelitis in the cervical spine. Multilevel uncal and synovial facet arthropathy in the cervical spine with resultant moderate central canal stenosis at C3-C4 and C5-C6. There is severe left foraminal stenosis at C6-C7 due to posterolateral osseous spurring.   Thoracic spine: Endplate degenerative change throughout the upper and midthoracic spine without high-grade canal stenosis, bone marrow edema, or spinal cord abnormality.   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 There is posterior central enhancing soft tissue at the level of L3-L4 consistent with phlegmonous change, without evidence of significant central canal stenosis at this level or epidural abscess. There is an enhancing Schmorl node within the inferior endplate of L3 with extension into the disc space. There is right greater than left paravertebral psoas muscle edema at the level of L3-L4 related to the discitis osteomyelitis, without evidence of a fluid collection. There is interval progression of bilateral foraminal stenosis at L3-L4 due to increased disc space narrowing with diffuse disc bulge and facet hypertrophy.   Unchanged moderate degenerative bilateral foraminal stenosis at L4-L5. No evidence of significant central canal stenosis.   Previously seen enhancing soft tissue within the right T12-L1 neural foramen is unchanged in size and demonstrates homogeneous enhancement consistent with a schwannoma. There is otherwise no abnormal leptomeningeal enhancement. Nonspecific unchanged in size enhancing bone marrow lesion within the right posterior ilium which may possibly represent an atypical hemangioma. Attention on follow-up is recommended.   Nondisplaced subacute fracture of the left L3 transverse process.    Signed by: Fede Holcomb 1/9/2024 11:34 PM Dictation workstation:   [id number]    CT lumbar spine wo IV contrast    Result Date: 1/9/2024  Interpreted By:  Salud Fisher, STUDY: CT CERVICAL SPINE WO IV CONTRAST; CT LUMBAR SPINE WO IV CONTRAST; CT THORACIC SPINE WO IV CONTRAST;  1/9/2024 6:47 pm; 1/9/2024 6:50 pm   INDICATION: Signs/Symptoms:frequent falls; Signs/Symptoms:low back pain leg weakness, frequent falls; Signs/Symptoms:frequent falls, pain.   COMPARISON: MRI lumbar spine 09/05/2023, CT pelvis 10/07/2023   ACCESSION NUMBER(S): [id number]; [id number]; [id number]   ORDERING CLINICIAN: GERSON BRYANT   TECHNIQUE: Axial noncontrast images of the cervical, thoracic and lumbar spine with coronal and sagittal reconstructed images.   FINDINGS: CERVICAL SPINE: ALIGNMENT: Minimal retrolisthesis of C3 on C4, C4 on C5 and C5 on C6. Facet joint and craniocervical line maintained. VERTEBRAE: Chronic endplates changes and subchondral cysts. Vertebral body heights are grossly maintained with demineralization. SPINAL CANAL: Degenerative changes facet and uncinate arthropathy, as well as disc space narrowing and end plate hypertrophy. C3-C4 with mild canal and foraminal narrowing. C5-C6 with moderate foraminal narrowing. C6-C7 with moderate foraminal narrowing. No critical canal stenosis. PREVERTEBRAL SOFT TISSUES: No prevertebral soft tissue swelling. LUNG APICES: Imaged portion of the lung apices are within normal limits.   THORACIC SPINE: ALIGNMENT: Dextroscoliosis of the midthoracic spine. Facet joint alignment maintained. VERTEBRAE: Demineralization without acute compression deformity or fracture identified. SPINAL CANAL: Multilevel degenerative changes including intervertebral disc space narrowing and endplate hypertrophy. No critical spinal canal stenosis. PREVERTEBRAL SOFT TISSUES: No prevertebral soft tissue swelling.   Significant aortic and coronary artery calcifications identified. Probable  atelectasis.   LUMBAR SPINE: ALIGNMENT: Minimal retrolisthesis of L1 on L2, L2 on L3 and L3 on L4. Minimal anterolisthesis of L4 on L5. Levoscoliosis centered about L2-L3. VERTEBRAE: Interval progression of endplate lucencies, lytic lesions and sclerosis along the inferior L3 vertebral body and superior L4 vertebral body without significant widening of the disc space. Findings are progressed since CT pelvis and possibly new since prior MRI vertebral body heights are otherwise grossly maintained no acute displaced fracture identified. SPINAL CANAL: Significant degenerative changes are again noted at L2-L3 with loss of intervertebral disc space and endplate sclerosis, greater on the right. Multilevel facet hypertrophy. L3 L4 disc bulge and posterior ligamentous hypertrophy with mild-to-moderate canal narrowing and at least moderate lateral recess stenosis as well as moderate to severe foraminal stenosis. Severe foraminal stenosis at L2-L3. PREVERTEBRAL SOFT TISSUES: No prevertebral soft tissue swelling.   OTHER FINDINGS: None.       Interval progression of lucent/lytic/erosive changes involving the inferior L3 and superior L4 vertebral bodies without significant disc space narrowing. Findings are progressed from prior CT pelvis and appear new from prior MRI given differences in technique. Changes are indeterminate and could represent discitis/osteomyelitis, malignancy or less likely degenerative changes. Clinical correlation and follow-up MRI with contrast may be considered.   Otherwise no acute fracture or dislocation. No critical canal stenosis identified.   Degenerative changes most significant at L2-L3 and up to moderate canal narrowing suspected at L3-L4.   Mild scoliosis.   MACRO: None.   Signed by: Salud Fisher 1/9/2024 7:44 PM Dictation workstation:   [id number]    CT thoracic spine wo IV contrast    Result Date: 1/9/2024  Interpreted By:  Salud Fisher, STUDY: CT CERVICAL SPINE WO IV CONTRAST; CT LUMBAR  SPINE WO IV CONTRAST; CT THORACIC SPINE WO IV CONTRAST;  1/9/2024 6:47 pm; 1/9/2024 6:50 pm   INDICATION: Signs/Symptoms:frequent falls; Signs/Symptoms:low back pain leg weakness, frequent falls; Signs/Symptoms:frequent falls, pain.   COMPARISON: MRI lumbar spine 09/05/2023, CT pelvis 10/07/2023   ACCESSION NUMBER(S): [id number]; [id number]; [id number]   ORDERING CLINICIAN: GERSON BRYANT   TECHNIQUE: Axial noncontrast images of the cervical, thoracic and lumbar spine with coronal and sagittal reconstructed images.   FINDINGS: CERVICAL SPINE: ALIGNMENT: Minimal retrolisthesis of C3 on C4, C4 on C5 and C5 on C6. Facet joint and craniocervical line maintained. VERTEBRAE: Chronic endplates changes and subchondral cysts. Vertebral body heights are grossly maintained with demineralization. SPINAL CANAL: Degenerative changes facet and uncinate arthropathy, as well as disc space narrowing and end plate hypertrophy. C3-C4 with mild canal and foraminal narrowing. C5-C6 with moderate foraminal narrowing. C6-C7 with moderate foraminal narrowing. No critical canal stenosis. PREVERTEBRAL SOFT TISSUES: No prevertebral soft tissue swelling. LUNG APICES: Imaged portion of the lung apices are within normal limits.   THORACIC SPINE: ALIGNMENT: Dextroscoliosis of the midthoracic spine. Facet joint alignment maintained. VERTEBRAE: Demineralization without acute compression deformity or fracture identified. SPINAL CANAL: Multilevel degenerative changes including intervertebral disc space narrowing and endplate hypertrophy. No critical spinal canal stenosis. PREVERTEBRAL SOFT TISSUES: No prevertebral soft tissue swelling.   Significant aortic and coronary artery calcifications identified. Probable atelectasis.   LUMBAR SPINE: ALIGNMENT: Minimal retrolisthesis of L1 on L2, L2 on L3 and L3 on L4. Minimal anterolisthesis of L4 on L5. Levoscoliosis centered about L2-L3. VERTEBRAE: Interval progression of endplate lucencies, lytic  lesions and sclerosis along the inferior L3 vertebral body and superior L4 vertebral body without significant widening of the disc space. Findings are progressed since CT pelvis and possibly new since prior MRI vertebral body heights are otherwise grossly maintained no acute displaced fracture identified. SPINAL CANAL: Significant degenerative changes are again noted at L2-L3 with loss of intervertebral disc space and endplate sclerosis, greater on the right. Multilevel facet hypertrophy. L3 L4 disc bulge and posterior ligamentous hypertrophy with mild-to-moderate canal narrowing and at least moderate lateral recess stenosis as well as moderate to severe foraminal stenosis. Severe foraminal stenosis at L2-L3. PREVERTEBRAL SOFT TISSUES: No prevertebral soft tissue swelling.   OTHER FINDINGS: None.       Interval progression of lucent/lytic/erosive changes involving the inferior L3 and superior L4 vertebral bodies without significant disc space narrowing. Findings are progressed from prior CT pelvis and appear new from prior MRI given differences in technique. Changes are indeterminate and could represent discitis/osteomyelitis, malignancy or less likely degenerative changes. Clinical correlation and follow-up MRI with contrast may be considered.   Otherwise no acute fracture or dislocation. No critical canal stenosis identified.   Degenerative changes most significant at L2-L3 and up to moderate canal narrowing suspected at L3-L4.   Mild scoliosis.   MACRO: None.   Signed by: Salud Fisher 1/9/2024 7:44 PM Dictation workstation:   [id number]    CT cervical spine wo IV contrast    Result Date: 1/9/2024  Interpreted By:  Salud Fisher, STUDY: CT CERVICAL SPINE WO IV CONTRAST; CT LUMBAR SPINE WO IV CONTRAST; CT THORACIC SPINE WO IV CONTRAST;  1/9/2024 6:47 pm; 1/9/2024 6:50 pm   INDICATION: Signs/Symptoms:frequent falls; Signs/Symptoms:low back pain leg weakness, frequent falls; Signs/Symptoms:frequent falls, pain.    COMPARISON: MRI lumbar spine 09/05/2023, CT pelvis 10/07/2023   ACCESSION NUMBER(S): [id number]; [id number]; [id number]   ORDERING CLINICIAN: GERSON BRYANT   TECHNIQUE: Axial noncontrast images of the cervical, thoracic and lumbar spine with coronal and sagittal reconstructed images.   FINDINGS: CERVICAL SPINE: ALIGNMENT: Minimal retrolisthesis of C3 on C4, C4 on C5 and C5 on C6. Facet joint and craniocervical line maintained. VERTEBRAE: Chronic endplates changes and subchondral cysts. Vertebral body heights are grossly maintained with demineralization. SPINAL CANAL: Degenerative changes facet and uncinate arthropathy, as well as disc space narrowing and end plate hypertrophy. C3-C4 with mild canal and foraminal narrowing. C5-C6 with moderate foraminal narrowing. C6-C7 with moderate foraminal narrowing. No critical canal stenosis. PREVERTEBRAL SOFT TISSUES: No prevertebral soft tissue swelling. LUNG APICES: Imaged portion of the lung apices are within normal limits.   THORACIC SPINE: ALIGNMENT: Dextroscoliosis of the midthoracic spine. Facet joint alignment maintained. VERTEBRAE: Demineralization without acute compression deformity or fracture identified. SPINAL CANAL: Multilevel degenerative changes including intervertebral disc space narrowing and endplate hypertrophy. No critical spinal canal stenosis. PREVERTEBRAL SOFT TISSUES: No prevertebral soft tissue swelling.   Significant aortic and coronary artery calcifications identified. Probable atelectasis.   LUMBAR SPINE: ALIGNMENT: Minimal retrolisthesis of L1 on L2, L2 on L3 and L3 on L4. Minimal anterolisthesis of L4 on L5. Levoscoliosis centered about L2-L3. VERTEBRAE: Interval progression of endplate lucencies, lytic lesions and sclerosis along the inferior L3 vertebral body and superior L4 vertebral body without significant widening of the disc space. Findings are progressed since CT pelvis and possibly new since prior MRI vertebral body heights are  otherwise grossly maintained no acute displaced fracture identified. SPINAL CANAL: Significant degenerative changes are again noted at L2-L3 with loss of intervertebral disc space and endplate sclerosis, greater on the right. Multilevel facet hypertrophy. L3 L4 disc bulge and posterior ligamentous hypertrophy with mild-to-moderate canal narrowing and at least moderate lateral recess stenosis as well as moderate to severe foraminal stenosis. Severe foraminal stenosis at L2-L3. PREVERTEBRAL SOFT TISSUES: No prevertebral soft tissue swelling.   OTHER FINDINGS: None.       Interval progression of lucent/lytic/erosive changes involving the inferior L3 and superior L4 vertebral bodies without significant disc space narrowing. Findings are progressed from prior CT pelvis and appear new from prior MRI given differences in technique. Changes are indeterminate and could represent discitis/osteomyelitis, malignancy or less likely degenerative changes. Clinical correlation and follow-up MRI with contrast may be considered.   Otherwise no acute fracture or dislocation. No critical canal stenosis identified.   Degenerative changes most significant at L2-L3 and up to moderate canal narrowing suspected at L3-L4.   Mild scoliosis.   MACRO: None.   Signed by: Salud Fisher 1/9/2024 7:44 PM Dictation workstation:   [id number]    CT head wo IV contrast    Result Date: 1/9/2024  Interpreted By:  Salud Fisher, STUDY: CT HEAD WO IV CONTRAST;  1/9/2024 6:47 pm   INDICATION: Signs/Symptoms:dizziness, frequent falls, head injury.   COMPARISON: 06/04/2016   ACCESSION NUMBER(S): [id number]   ORDERING CLINICIAN: GERSON BRYANT   TECHNIQUE: Axial noncontrast CT images of the head.   FINDINGS: BRAIN PARENCHYMA: Gray-white matter interfaces are preserved. No mass effect or midline shift. Generalized parenchymal volume loss noted with concordant ventricular enlargement. Non-specific white matter changes noted, which may be related to small  vessel disease.   HEMORRHAGE: No acute intracranial hemorrhage. VENTRICLES and EXTRA-AXIAL SPACES: The ventricles, sulci and basal cisterns enlarged, concordant with parenchymal volume loss. EXTRACRANIAL SOFT TISSUES: Within normal limits. PARANASAL SINUSES/MASTOIDS: The visualized paranasal sinuses and mastoid air cells are aerated. CALVARIUM: No depressed skull fracture. No destructive osseous lesion.   OTHER FINDINGS: None.       No acute intracranial hemorrhage or mass effect.     MACRO: None.   Signed by: Salud Fisher 1/9/2024 7:23 PM Dictation workstation:   [id number]    XR pelvis 1-2 views    Result Date: 1/9/2024  Interpreted By:  Salud Fisher, STUDY: XR PELVIS 1-2 VIEWS; ;  1/9/2024 6:34 pm   INDICATION: Signs/Symptoms:fall.   COMPARISON: None.   ACCESSION NUMBER(S): [id number]   ORDERING CLINICIAN: GERSON BRYANT   FINDINGS: AP pelvis   Degenerative changes of the bilateral hip joints. Scattered surgical clips in the medial upper thighs. Vascular calcifications noted.   No acute fracture, diastasis or dislocation identified.       No acute osseous abnormality identified.     MACRO: None   Signed by: Salud Fisher 1/9/2024 7:19 PM Dictation workstation:   [id number]    XR chest 1 view    Result Date: 1/9/2024  Interpreted By:  Salud Fisher, STUDY: XR CHEST 1 VIEW;  1/9/2024 6:34 pm   INDICATION: Signs/Symptoms:weakness.   COMPARISON: 01/21/2023   ACCESSION NUMBER(S): [id number]   ORDERING CLINICIAN: GERSON BRYANT   FINDINGS: AP radiograph of the chest was provided.       CARDIOMEDIASTINAL SILHOUETTE: Cardiomediastinal silhouette is normal in size and configuration. Atherosclerotic calcifications of the aortic arch.   LUNGS: No focal consolidation, pleural effusion or sizable pneumothorax seen.   ABDOMEN: No remarkable upper abdominal findings.   BONES: No acute osseous changes.       1.  No focal consolidation or sizeable pleural effusion.       MACRO: None   Signed by: Salud Fisher  1/9/2024 7:12 PM Dictation workstation:   [id number]   Urinalysis was negative for leukocyte esterase  CRP of 7.25.  Sed rate of 37  Lactate of 2.5    Assessment/Plan     IMPRESSION:    Lumbar discitis and osteomyelitis, treated recently, negative culture on aspiration  Severe cervical spine stenosis  Frequent falls  CRP elevation    PLAN:  Continue IV vancomycin and cefepime for now  Follow-up blood cultures  Recommend transfer to the [facility] for further neuro surgical evaluation  Follow-up CBC BMP   HS CRP in am    D/W DR Clemente Justin MD

## 2024-01-10 NOTE — H&P
History Of Present Illness  Ant Alcala is a 83 y.o. male presenting with dizziness, multiple falls and back pain.  Patient reported that his dizziness was sudden, usually when standing up using his walker.  He had multiple episodes of sudden dizziness since yesterday with some spinning sensation.  Patient also reported feeling weak in both legs and upper extremities so he sat down to avoid trauma.  He did not hit his head.  No loss of consciousness.  Patient denied fever, vomiting, diarrhea or shortness of breath.    Patient had history of cervical spinal stenosis.  Patient also has discitis osteomyelitis of lumbar spine.  [facility] for [facility] spine infection.  He was discharged on 12/29/2023.  He is on oral antibiotics now.    ER course: Patient was awake, alert, oriented, no acute distress.  He was hemodynamically stable and afebrile.  His labs shows no leukocytosis.  He has elevated C-reactive protein and ESR.  He has hyperglycemia 320 and mild elevation of lactate likely from dehydration.  CT head was negative for acute intracranial pathology.  MRI cervical spine shows moderate central canal stenosis at C3-C6, with severe foraminal stenosis  MRI thoracic spine shows evidence of schwannoma at right T12/L1.  MRI lumbar spine shows discitis osteomyelitis at the level L3/L4.  There is enhancing Schmorl node within the inferior endplate of L3.  There is right greater than left paravertebral psoas muscle edema at the level of L3/L4 related to discitis osteomyelitis no abscess collection.  There is nondisplaced subacute fracture of the left L3 transverse process.      Past Medical History  Diabetes mellitus  Lumbar osteomyelitis  Hypertension  Hyperlipidemia  Diabetic retinopathy  Diabetic peripheral neuropathy  Spinal stenosis    Surgical History  He has a past surgical history that includes US guided biopsy prostate (8/21/2019).  Lumbar spine  "biopsy  Retinal injections  Lumbar spine injections    Social History  He has no history on file for tobacco use, alcohol use, and drug use.  Patient is a retired     Family History  No family history on file.     Allergies  Patient has no known allergies.    Review of Systems  10/10 points review of system were conducted, negative except as above    Physical Exam  Constitutional:       Appearance: Normal appearance.   HENT:      Head: Normocephalic and atraumatic.      Nose: Nose normal. No congestion or rhinorrhea.      Mouth/Throat:      Mouth: Mucous membranes are dry.   Eyes:      General: No scleral icterus.     Extraocular Movements: Extraocular movements intact.      Pupils: Pupils are equal, round, and reactive to light.   Neck:      Vascular: No carotid bruit.   Cardiovascular:      Rate and Rhythm: Normal rate and regular rhythm.      Pulses: Normal pulses.      Heart sounds: Normal heart sounds.   Pulmonary:      Effort: Pulmonary effort is normal.      Breath sounds: Normal breath sounds.   Abdominal:      General: Bowel sounds are normal. There is no distension.      Palpations: Abdomen is soft. There is no mass.      Tenderness: There is no abdominal tenderness. There is no right CVA tenderness, left CVA tenderness, guarding or rebound.      Hernia: No hernia is present.   Musculoskeletal:      Cervical back: Normal range of motion and neck supple. No tenderness.   Neurological:      Mental Status: He is alert.          Last Recorded Vitals  Blood pressure 133/64, pulse 69, temperature 36.8 °C (98.2 °F), temperature source Temporal, resp. rate 14, height 1.727 m (5' 8\"), weight 79.4 kg (175 lb), SpO2 93 %.    Relevant Results    Scheduled medications  amLODIPine, 10 mg, oral, Daily  aspirin, 81 mg, oral, Daily  atorvastatin, 40 mg, oral, Daily  carvedilol, 6.25 mg, oral, BID  cefepime, 2 g, intravenous, Once  gabapentin, 100 mg, oral, TID  gemfibrozil, 600 mg, oral, BID  insulin lispro, 0-15 " Units, subcutaneous, TID with meals  losartan, 50 mg, oral, BID  pantoprazole, 40 mg, oral, Daily before breakfast   Or  pantoprazole, 40 mg, intravenous, Daily before breakfast  vancomycin, 1,000 mg, intravenous, Once      Continuous medications     PRN medications  PRN medications: acetaminophen **OR** acetaminophen **OR** acetaminophen, dextrose 10 % in water (D10W), dextrose, glucagon, HYDROcodone-acetaminophen, melatonin, morphine, ondansetron **OR** ondansetron, polyethylene glycol      Results for orders placed or performed during the hospital encounter of 01/09/24 (from the past 24 hour(s))   CBC and Auto Differential   Result Value Ref Range    WBC 10.9 4.4 - 11.3 x10*3/uL    nRBC 0.0 0.0 - 0.0 /100 WBCs    RBC 3.71 (L) 4.50 - 5.90 x10*6/uL    Hemoglobin 10.8 (L) 13.5 - 17.5 g/dL    Hematocrit 32.0 (L) 41.0 - 52.0 %    MCV 86 80 - 100 fL    MCH 29.1 26.0 - 34.0 pg    MCHC 33.8 32.0 - 36.0 g/dL    RDW 13.8 11.5 - 14.5 %    Platelets 504 (H) 150 - 450 x10*3/uL    Neutrophils % 66.9 40.0 - 80.0 %    Immature Granulocytes %, Automated 0.4 0.0 - 0.9 %    Lymphocytes % 15.5 13.0 - 44.0 %    Monocytes % 14.8 2.0 - 10.0 %    Eosinophils % 1.7 0.0 - 6.0 %    Basophils % 0.7 0.0 - 2.0 %    Neutrophils Absolute 7.26 (H) 1.60 - 5.50 x10*3/uL    Immature Granulocytes Absolute, Automated 0.04 0.00 - 0.50 x10*3/uL    Lymphocytes Absolute 1.69 0.80 - 3.00 x10*3/uL    Monocytes Absolute 1.61 (H) 0.05 - 0.80 x10*3/uL    Eosinophils Absolute 0.19 0.00 - 0.40 x10*3/uL    Basophils Absolute 0.08 0.00 - 0.10 x10*3/uL   Comprehensive metabolic panel   Result Value Ref Range    Glucose 320 (H) 74 - 99 mg/dL    Sodium 131 (L) 136 - 145 mmol/L    Potassium 3.9 3.5 - 5.3 mmol/L    Chloride 94 (L) 98 - 107 mmol/L    Bicarbonate 25 21 - 32 mmol/L    Anion Gap 16 10 - 20 mmol/L    Urea Nitrogen 42 (H) 6 - 23 mg/dL    Creatinine 1.25 0.50 - 1.30 mg/dL    eGFR 58 (L) >60 mL/min/1.73m*2    Calcium 9.1 8.6 - 10.3 mg/dL    Albumin 3.8 3.4 -  5.0 g/dL    Alkaline Phosphatase 56 33 - 136 U/L    Total Protein 6.7 6.4 - 8.2 g/dL    AST 13 9 - 39 U/L    Bilirubin, Total 0.4 0.0 - 1.2 mg/dL    ALT 11 10 - 52 U/L   Magnesium   Result Value Ref Range    Magnesium 2.40 1.60 - 2.40 mg/dL   Lactate   Result Value Ref Range    Lactate 2.5 (H) 0.4 - 2.0 mmol/L   Protime-INR   Result Value Ref Range    Protime 11.1 9.8 - 12.8 seconds    INR 1.0 0.9 - 1.1   Sedimentation rate, automated   Result Value Ref Range    Sedimentation Rate 37 (H) 0 - 20 mm/h   C-reactive protein   Result Value Ref Range    C-Reactive Protein 7.25 (H) <1.00 mg/dL   Troponin I, High Sensitivity, Initial   Result Value Ref Range    Troponin I, High Sensitivity 10 0 - 20 ng/L   Troponin, High Sensitivity, 1 Hour   Result Value Ref Range    Troponin I, High Sensitivity 11 0 - 20 ng/L   Lactate   Result Value Ref Range    Lactate 1.5 0.4 - 2.0 mmol/L      MR lumbar spine w and wo IV contrast    Result Date: 1/9/2024  Interpreted By:  Fede Holcomb, STUDY: MR CERVICAL SPINE W AND WO IV CONTRAST; MR THORACIC SPINE W AND WO IV CONTRAST; MR LUMBAR SPINE W AND WO IV CONTRAST;  1/9/2024 10:06 pm   INDICATION: Signs/Symptoms:recent spinal infection worsening back pain leg weakness.   COMPARISON: CT cervical and thoracic and lumbar spine dated 01/09/2024.  MR lumbar spine dated 09/05/2023.   ACCESSION NUMBER(S): [id number]; [id number]; [id number]   ORDERING CLINICIAN: GERSON BRYANT   TECHNIQUE: Pre and post contrast multiplanar and multisequence images were acquired through the cervical spine, thoracic spine, and lumbar spine. Post contrast images were obtained after administration of 16 mL of Dotarem intravenous contrast.   FINDINGS:   Cervical spine:   Alignment: There is mild degenerative C3 over C4 retrolisthesis and C5 over C6.   Vertebrae/Intervertebral Discs: The vertebral body heights are maintained. There is mildly heterogeneous bone marrow signal without evidence of focal suspicious  osseous lesions. There is diffuse degenerative disc space narrowing from C3 through C7.   There are no epidural collections.   Cord: Normal spinal cord signal and contour.   C1-C2: The cervicomedullary junction appears unremarkable. There is no central canal stenosis.   C2-C3: Small disc bulge, contributing to partial effacement of the thecal sac. No foraminal stenosis.   C3-C4: Central disc osteophyte complex, mild retrolisthesis, with posterolateral osseous spurring results in complete effacement of the thecal sac with moderate central canal stenosis and moderate bilateral foraminal stenosis.   C4-C5: Mild central disc osteophyte complex and ligamentum flavum infolding contributes to mild central canal stenosis and mild bilateral foraminal stenosis.   C5-C6: There is a central disc osteophyte complex algorithm flavum infolding resulting in moderate central canal stenosis with almost complete effacement of the thecal sac. There is severe left and moderate right foraminal stenosis due to posterolateral osseous spurring.   C6-C7: Small disc osteophyte complex. No significant central canal stenosis. There is moderate bilateral foraminal stenosis due to posterolateral osseous spurring.   C7-T1: There is no significant disc bulge or herniation. There is no significant central canal or neural foraminal stenosis.   Thoracic spine: Normal thoracic kyphosis. The spinal cord is normal in signal and contour. Vertebral body heights  are maintained. There is multifocal fatty bone marrow change in the upper and midthoracic spine without focal bone marrow lesions. There is also multilevel degenerative disc space narrowing in the upper and midthoracic spine.   There are small central disc osteophyte complexes at T2-T3, T3-T4, T6-T7 contributing to mild central canal stenosis at these levels. There is multilevel degenerative disc space narrowing in the midthoracic spine. There is no evidence of high-grade canal stenosis, spinal  cord signal or contour abnormality in the thoracic spine. No evidence of abnormal enhancement within the spinal canal.   Lumbar spine: There is straightening of lumbar lordosis. Minimal degenerative anterolisthesis of L4 over L5, and degenerative retrolisthesis of L2 over L3 similar to prior. There is interval development of extensive bone marrow edema and intra discal edema at L3-L4 since September 2022. There is an enhancing rounded Schmorl node within the central inferior endplate of L3 extending into the disc space. There is focal enhancement and edema of the left L3 transverse process, axial image 22 of 46 consistent with a nondisplaced fracture.   There is focal T1 hypointense enhancing bone marrow lesion within the right iliac bone unchanged from prior, axial image 40 of 46, which is nonspecific and may represent an atypical hemangioma, axial image 40 of 46.   Previously seen T1 hypointense T2 hyperintense nodule within the right T12-L1 neural foramen is unchanged in size and demonstrates diffuse enhancement, measuring 1.1 x 0.7 cm, likely representing a nerve root schwannoma.   T12-L2: No significant disc bulge or herniation. No significant central canal or foraminal stenosis. Note there is an enhancing homogeneous nodule within the right T12-L1 neural foramen consistent with a schwannoma. L2-L3: Mild retrolisthesis of L3 over L4 and facet hypertrophy without central canal stenosis. There is mild-to-moderate bilateral foraminal stenosis due to osseous spurring similar to prior. L3-L4: There is ventral epidural enhancement posterior to the L3 and L4 vertebral bodies which may represent reactive venous epidural plexus versus phlegmon. There is also enhancing disc material in the ventral epidural space measuring approximately 1.0 cm craniocaudally and point 6 cm in anterior-posterior dimension, sagittal image 10 of 19. This contributes to mild left subarticular stenosis without significant central canal  stenosis. There is moderate to severe lbilateral foraminal stenosis due to progressing disc space narrowing with disc bulge and facet hypertrophy. There is increasing left foraminal disc protrusion. There is mild enhancement of the bilateral facets with small right-sided facet effusion, unchanged from September 2023, likely degenerative. L4-L5: There is similar moderate right greater than left foraminal stenosis due to facet hypertrophy and disc bulge. There is enhancing right-sided facet effusion, likely degenerative similar to prior. L5-S1: No significant disc bulge or herniation. No significant canal stenosis.     Other:         Cervical spine: No evidence of focal suspicious bone marrow lesions or discitis osteomyelitis in the cervical spine. Multilevel uncal and synovial facet arthropathy in the cervical spine with resultant moderate central canal stenosis at C3-C4 and C5-C6. There is severe left foraminal stenosis at C6-C7 due to posterolateral osseous spurring.   Thoracic spine: Endplate degenerative change throughout the upper and midthoracic spine without high-grade canal stenosis, bone marrow edema, or spinal cord abnormality.   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 There is posterior central enhancing soft tissue at the level of L3-L4 consistent with phlegmonous change, without evidence of significant central canal stenosis at this level or epidural abscess. There is an enhancing Schmorl node within the inferior endplate of L3 with extension into the disc space. There is right greater than left paravertebral psoas muscle edema at the level of L3-L4 related to the discitis osteomyelitis, without evidence of a fluid collection. There is interval progression of bilateral foraminal stenosis at L3-L4 due to increased disc space narrowing with diffuse disc bulge and facet hypertrophy.   Unchanged moderate degenerative bilateral foraminal stenosis at L4-L5. No evidence of significant central canal  stenosis.   Previously seen enhancing soft tissue within the right T12-L1 neural foramen is unchanged in size and demonstrates homogeneous enhancement consistent with a schwannoma. There is otherwise no abnormal leptomeningeal enhancement. Nonspecific unchanged in size enhancing bone marrow lesion within the right posterior ilium which may possibly represent an atypical hemangioma. Attention on follow-up is recommended.   Nondisplaced subacute fracture of the left L3 transverse process.   Signed by: Fede Holcomb 1/9/2024 11:34 PM Dictation workstation:   [id number]    MR thoracic spine w and wo IV contrast    Result Date: 1/9/2024  Interpreted By:  Fede Holcomb, STUDY: MR CERVICAL SPINE W AND WO IV CONTRAST; MR THORACIC SPINE W AND WO IV CONTRAST; MR LUMBAR SPINE W AND WO IV CONTRAST;  1/9/2024 10:06 pm   INDICATION: Signs/Symptoms:recent spinal infection worsening back pain leg weakness.   COMPARISON: CT cervical and thoracic and lumbar spine dated 01/09/2024.  MR lumbar spine dated 09/05/2023.   ACCESSION NUMBER(S): [id number]; [id number]; [id number]   ORDERING CLINICIAN: GERSON BRYANT   TECHNIQUE: Pre and post contrast multiplanar and multisequence images were acquired through the cervical spine, thoracic spine, and lumbar spine. Post contrast images were obtained after administration of 16 mL of Dotarem intravenous contrast.   FINDINGS:   Cervical spine:   Alignment: There is mild degenerative C3 over C4 retrolisthesis and C5 over C6.   Vertebrae/Intervertebral Discs: The vertebral body heights are maintained. There is mildly heterogeneous bone marrow signal without evidence of focal suspicious osseous lesions. There is diffuse degenerative disc space narrowing from C3 through C7.   There are no epidural collections.   Cord: Normal spinal cord signal and contour.   C1-C2: The cervicomedullary junction appears unremarkable. There is no central canal stenosis.   C2-C3: Small disc bulge, contributing  to partial effacement of the thecal sac. No foraminal stenosis.   C3-C4: Central disc osteophyte complex, mild retrolisthesis, with posterolateral osseous spurring results in complete effacement of the thecal sac with moderate central canal stenosis and moderate bilateral foraminal stenosis.   C4-C5: Mild central disc osteophyte complex and ligamentum flavum infolding contributes to mild central canal stenosis and mild bilateral foraminal stenosis.   C5-C6: There is a central disc osteophyte complex algorithm flavum infolding resulting in moderate central canal stenosis with almost complete effacement of the thecal sac. There is severe left and moderate right foraminal stenosis due to posterolateral osseous spurring.   C6-C7: Small disc osteophyte complex. No significant central canal stenosis. There is moderate bilateral foraminal stenosis due to posterolateral osseous spurring.   C7-T1: There is no significant disc bulge or herniation. There is no significant central canal or neural foraminal stenosis.   Thoracic spine: Normal thoracic kyphosis. The spinal cord is normal in signal and contour. Vertebral body heights  are maintained. There is multifocal fatty bone marrow change in the upper and midthoracic spine without focal bone marrow lesions. There is also multilevel degenerative disc space narrowing in the upper and midthoracic spine.   There are small central disc osteophyte complexes at T2-T3, T3-T4, T6-T7 contributing to mild central canal stenosis at these levels. There is multilevel degenerative disc space narrowing in the midthoracic spine. There is no evidence of high-grade canal stenosis, spinal cord signal or contour abnormality in the thoracic spine. No evidence of abnormal enhancement within the spinal canal.   Lumbar spine: There is straightening of lumbar lordosis. Minimal degenerative anterolisthesis of L4 over L5, and degenerative retrolisthesis of L2 over L3 similar to prior. There is interval  development of extensive bone marrow edema and intra discal edema at L3-L4 since September 2022. There is an enhancing rounded Schmorl node within the central inferior endplate of L3 extending into the disc space. There is focal enhancement and edema of the left L3 transverse process, axial image 22 of 46 consistent with a nondisplaced fracture.   There is focal T1 hypointense enhancing bone marrow lesion within the right iliac bone unchanged from prior, axial image 40 of 46, which is nonspecific and may represent an atypical hemangioma, axial image 40 of 46.   Previously seen T1 hypointense T2 hyperintense nodule within the right T12-L1 neural foramen is unchanged in size and demonstrates diffuse enhancement, measuring 1.1 x 0.7 cm, likely representing a nerve root schwannoma.   T12-L2: No significant disc bulge or herniation. No significant central canal or foraminal stenosis. Note there is an enhancing homogeneous nodule within the right T12-L1 neural foramen consistent with a schwannoma. L2-L3: Mild retrolisthesis of L3 over L4 and facet hypertrophy without central canal stenosis. There is mild-to-moderate bilateral foraminal stenosis due to osseous spurring similar to prior. L3-L4: There is ventral epidural enhancement posterior to the L3 and L4 vertebral bodies which may represent reactive venous epidural plexus versus phlegmon. There is also enhancing disc material in the ventral epidural space measuring approximately 1.0 cm craniocaudally and point 6 cm in anterior-posterior dimension, sagittal image 10 of 19. This contributes to mild left subarticular stenosis without significant central canal stenosis. There is moderate to severe lbilateral foraminal stenosis due to progressing disc space narrowing with disc bulge and facet hypertrophy. There is increasing left foraminal disc protrusion. There is mild enhancement of the bilateral facets with small right-sided facet effusion, unchanged from September 2023,  likely degenerative. L4-L5: There is similar moderate right greater than left foraminal stenosis due to facet hypertrophy and disc bulge. There is enhancing right-sided facet effusion, likely degenerative similar to prior. L5-S1: No significant disc bulge or herniation. No significant canal stenosis.     Other:         Cervical spine: No evidence of focal suspicious bone marrow lesions or discitis osteomyelitis in the cervical spine. Multilevel uncal and synovial facet arthropathy in the cervical spine with resultant moderate central canal stenosis at C3-C4 and C5-C6. There is severe left foraminal stenosis at C6-C7 due to posterolateral osseous spurring.   Thoracic spine: Endplate degenerative change throughout the upper and midthoracic spine without high-grade canal stenosis, bone marrow edema, or spinal cord abnormality.   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 There is posterior central enhancing soft tissue at the level of L3-L4 consistent with phlegmonous change, without evidence of significant central canal stenosis at this level or epidural abscess. There is an enhancing Schmorl node within the inferior endplate of L3 with extension into the disc space. There is right greater than left paravertebral psoas muscle edema at the level of L3-L4 related to the discitis osteomyelitis, without evidence of a fluid collection. There is interval progression of bilateral foraminal stenosis at L3-L4 due to increased disc space narrowing with diffuse disc bulge and facet hypertrophy.   Unchanged moderate degenerative bilateral foraminal stenosis at L4-L5. No evidence of significant central canal stenosis.   Previously seen enhancing soft tissue within the right T12-L1 neural foramen is unchanged in size and demonstrates homogeneous enhancement consistent with a schwannoma. There is otherwise no abnormal leptomeningeal enhancement. Nonspecific unchanged in size enhancing bone marrow lesion within the right  posterior ilium which may possibly represent an atypical hemangioma. Attention on follow-up is recommended.   Nondisplaced subacute fracture of the left L3 transverse process.   Signed by: Fede Holcomb 1/9/2024 11:34 PM Dictation workstation:   [id number]    MR cervical spine w and wo IV contrast    Result Date: 1/9/2024  Interpreted By:  Fede Holcomb, STUDY: MR CERVICAL SPINE W AND WO IV CONTRAST; MR THORACIC SPINE W AND WO IV CONTRAST; MR LUMBAR SPINE W AND WO IV CONTRAST;  1/9/2024 10:06 pm   INDICATION: Signs/Symptoms:recent spinal infection worsening back pain leg weakness.   COMPARISON: CT cervical and thoracic and lumbar spine dated 01/09/2024.  MR lumbar spine dated 09/05/2023.   ACCESSION NUMBER(S): [id number]; [id number]; [id number]   ORDERING CLINICIAN: GERSON BRYANT   TECHNIQUE: Pre and post contrast multiplanar and multisequence images were acquired through the cervical spine, thoracic spine, and lumbar spine. Post contrast images were obtained after administration of 16 mL of Dotarem intravenous contrast.   FINDINGS:   Cervical spine:   Alignment: There is mild degenerative C3 over C4 retrolisthesis and C5 over C6.   Vertebrae/Intervertebral Discs: The vertebral body heights are maintained. There is mildly heterogeneous bone marrow signal without evidence of focal suspicious osseous lesions. There is diffuse degenerative disc space narrowing from C3 through C7.   There are no epidural collections.   Cord: Normal spinal cord signal and contour.   C1-C2: The cervicomedullary junction appears unremarkable. There is no central canal stenosis.   C2-C3: Small disc bulge, contributing to partial effacement of the thecal sac. No foraminal stenosis.   C3-C4: Central disc osteophyte complex, mild retrolisthesis, with posterolateral osseous spurring results in complete effacement of the thecal sac with moderate central canal stenosis and moderate bilateral foraminal stenosis.   C4-C5: Mild central  disc osteophyte complex and ligamentum flavum infolding contributes to mild central canal stenosis and mild bilateral foraminal stenosis.   C5-C6: There is a central disc osteophyte complex algorithm flavum infolding resulting in moderate central canal stenosis with almost complete effacement of the thecal sac. There is severe left and moderate right foraminal stenosis due to posterolateral osseous spurring.   C6-C7: Small disc osteophyte complex. No significant central canal stenosis. There is moderate bilateral foraminal stenosis due to posterolateral osseous spurring.   C7-T1: There is no significant disc bulge or herniation. There is no significant central canal or neural foraminal stenosis.   Thoracic spine: Normal thoracic kyphosis. The spinal cord is normal in signal and contour. Vertebral body heights  are maintained. There is multifocal fatty bone marrow change in the upper and midthoracic spine without focal bone marrow lesions. There is also multilevel degenerative disc space narrowing in the upper and midthoracic spine.   There are small central disc osteophyte complexes at T2-T3, T3-T4, T6-T7 contributing to mild central canal stenosis at these levels. There is multilevel degenerative disc space narrowing in the midthoracic spine. There is no evidence of high-grade canal stenosis, spinal cord signal or contour abnormality in the thoracic spine. No evidence of abnormal enhancement within the spinal canal.   Lumbar spine: There is straightening of lumbar lordosis. Minimal degenerative anterolisthesis of L4 over L5, and degenerative retrolisthesis of L2 over L3 similar to prior. There is interval development of extensive bone marrow edema and intra discal edema at L3-L4 since September 2022. There is an enhancing rounded Schmorl node within the central inferior endplate of L3 extending into the disc space. There is focal enhancement and edema of the left L3 transverse process, axial image 22 of 46  consistent with a nondisplaced fracture.   There is focal T1 hypointense enhancing bone marrow lesion within the right iliac bone unchanged from prior, axial image 40 of 46, which is nonspecific and may represent an atypical hemangioma, axial image 40 of 46.   Previously seen T1 hypointense T2 hyperintense nodule within the right T12-L1 neural foramen is unchanged in size and demonstrates diffuse enhancement, measuring 1.1 x 0.7 cm, likely representing a nerve root schwannoma.   T12-L2: No significant disc bulge or herniation. No significant central canal or foraminal stenosis. Note there is an enhancing homogeneous nodule within the right T12-L1 neural foramen consistent with a schwannoma. L2-L3: Mild retrolisthesis of L3 over L4 and facet hypertrophy without central canal stenosis. There is mild-to-moderate bilateral foraminal stenosis due to osseous spurring similar to prior. L3-L4: There is ventral epidural enhancement posterior to the L3 and L4 vertebral bodies which may represent reactive venous epidural plexus versus phlegmon. There is also enhancing disc material in the ventral epidural space measuring approximately 1.0 cm craniocaudally and point 6 cm in anterior-posterior dimension, sagittal image 10 of 19. This contributes to mild left subarticular stenosis without significant central canal stenosis. There is moderate to severe lbilateral foraminal stenosis due to progressing disc space narrowing with disc bulge and facet hypertrophy. There is increasing left foraminal disc protrusion. There is mild enhancement of the bilateral facets with small right-sided facet effusion, unchanged from September 2023, likely degenerative. L4-L5: There is similar moderate right greater than left foraminal stenosis due to facet hypertrophy and disc bulge. There is enhancing right-sided facet effusion, likely degenerative similar to prior. L5-S1: No significant disc bulge or herniation. No significant canal stenosis.      Other:         Cervical spine: No evidence of focal suspicious bone marrow lesions or discitis osteomyelitis in the cervical spine. Multilevel uncal and synovial facet arthropathy in the cervical spine with resultant moderate central canal stenosis at C3-C4 and C5-C6. There is severe left foraminal stenosis at C6-C7 due to posterolateral osseous spurring.   Thoracic spine: Endplate degenerative change throughout the upper and midthoracic spine without high-grade canal stenosis, bone marrow edema, or spinal cord abnormality.   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 There is posterior central enhancing soft tissue at the level of L3-L4 consistent with phlegmonous change, without evidence of significant central canal stenosis at this level or epidural abscess. There is an enhancing Schmorl node within the inferior endplate of L3 with extension into the disc space. There is right greater than left paravertebral psoas muscle edema at the level of L3-L4 related to the discitis osteomyelitis, without evidence of a fluid collection. There is interval progression of bilateral foraminal stenosis at L3-L4 due to increased disc space narrowing with diffuse disc bulge and facet hypertrophy.   Unchanged moderate degenerative bilateral foraminal stenosis at L4-L5. No evidence of significant central canal stenosis.   Previously seen enhancing soft tissue within the right T12-L1 neural foramen is unchanged in size and demonstrates homogeneous enhancement consistent with a schwannoma. There is otherwise no abnormal leptomeningeal enhancement. Nonspecific unchanged in size enhancing bone marrow lesion within the right posterior ilium which may possibly represent an atypical hemangioma. Attention on follow-up is recommended.   Nondisplaced subacute fracture of the left L3 transverse process.   Signed by: Fede Holcomb 1/9/2024 11:34 PM Dictation workstation:   [id number]    CT lumbar spine wo IV contrast    Result Date:  1/9/2024  Interpreted By:  Salud Fisher, STUDY: CT CERVICAL SPINE WO IV CONTRAST; CT LUMBAR SPINE WO IV CONTRAST; CT THORACIC SPINE WO IV CONTRAST;  1/9/2024 6:47 pm; 1/9/2024 6:50 pm   INDICATION: Signs/Symptoms:frequent falls; Signs/Symptoms:low back pain leg weakness, frequent falls; Signs/Symptoms:frequent falls, pain.   COMPARISON: MRI lumbar spine 09/05/2023, CT pelvis 10/07/2023   ACCESSION NUMBER(S): [id number]; [id number]; [id number]   ORDERING CLINICIAN: GERSON BRYANT   TECHNIQUE: Axial noncontrast images of the cervical, thoracic and lumbar spine with coronal and sagittal reconstructed images.   FINDINGS: CERVICAL SPINE: ALIGNMENT: Minimal retrolisthesis of C3 on C4, C4 on C5 and C5 on C6. Facet joint and craniocervical line maintained. VERTEBRAE: Chronic endplates changes and subchondral cysts. Vertebral body heights are grossly maintained with demineralization. SPINAL CANAL: Degenerative changes facet and uncinate arthropathy, as well as disc space narrowing and end plate hypertrophy. C3-C4 with mild canal and foraminal narrowing. C5-C6 with moderate foraminal narrowing. C6-C7 with moderate foraminal narrowing. No critical canal stenosis. PREVERTEBRAL SOFT TISSUES: No prevertebral soft tissue swelling. LUNG APICES: Imaged portion of the lung apices are within normal limits.   THORACIC SPINE: ALIGNMENT: Dextroscoliosis of the midthoracic spine. Facet joint alignment maintained. VERTEBRAE: Demineralization without acute compression deformity or fracture identified. SPINAL CANAL: Multilevel degenerative changes including intervertebral disc space narrowing and endplate hypertrophy. No critical spinal canal stenosis. PREVERTEBRAL SOFT TISSUES: No prevertebral soft tissue swelling.   Significant aortic and coronary artery calcifications identified. Probable atelectasis.   LUMBAR SPINE: ALIGNMENT: Minimal retrolisthesis of L1 on L2, L2 on L3 and L3 on L4. Minimal anterolisthesis of L4 on L5.  Levoscoliosis centered about L2-L3. VERTEBRAE: Interval progression of endplate lucencies, lytic lesions and sclerosis along the inferior L3 vertebral body and superior L4 vertebral body without significant widening of the disc space. Findings are progressed since CT pelvis and possibly new since prior MRI vertebral body heights are otherwise grossly maintained no acute displaced fracture identified. SPINAL CANAL: Significant degenerative changes are again noted at L2-L3 with loss of intervertebral disc space and endplate sclerosis, greater on the right. Multilevel facet hypertrophy. L3 L4 disc bulge and posterior ligamentous hypertrophy with mild-to-moderate canal narrowing and at least moderate lateral recess stenosis as well as moderate to severe foraminal stenosis. Severe foraminal stenosis at L2-L3. PREVERTEBRAL SOFT TISSUES: No prevertebral soft tissue swelling.   OTHER FINDINGS: None.       Interval progression of lucent/lytic/erosive changes involving the inferior L3 and superior L4 vertebral bodies without significant disc space narrowing. Findings are progressed from prior CT pelvis and appear new from prior MRI given differences in technique. Changes are indeterminate and could represent discitis/osteomyelitis, malignancy or less likely degenerative changes. Clinical correlation and follow-up MRI with contrast may be considered.   Otherwise no acute fracture or dislocation. No critical canal stenosis identified.   Degenerative changes most significant at L2-L3 and up to moderate canal narrowing suspected at L3-L4.   Mild scoliosis.   MACRO: None.   Signed by: Salud Fisher 1/9/2024 7:44 PM Dictation workstation:   [id number]    CT thoracic spine wo IV contrast    Result Date: 1/9/2024  Interpreted By:  Salud Fisher, STUDY: CT CERVICAL SPINE WO IV CONTRAST; CT LUMBAR SPINE WO IV CONTRAST; CT THORACIC SPINE WO IV CONTRAST;  1/9/2024 6:47 pm; 1/9/2024 6:50 pm   INDICATION: Signs/Symptoms:frequent  falls; Signs/Symptoms:low back pain leg weakness, frequent falls; Signs/Symptoms:frequent falls, pain.   COMPARISON: MRI lumbar spine 09/05/2023, CT pelvis 10/07/2023   ACCESSION NUMBER(S): [id number]; [id number]; [id number]   ORDERING CLINICIAN: GERSON BRYANT   TECHNIQUE: Axial noncontrast images of the cervical, thoracic and lumbar spine with coronal and sagittal reconstructed images.   FINDINGS: CERVICAL SPINE: ALIGNMENT: Minimal retrolisthesis of C3 on C4, C4 on C5 and C5 on C6. Facet joint and craniocervical line maintained. VERTEBRAE: Chronic endplates changes and subchondral cysts. Vertebral body heights are grossly maintained with demineralization. SPINAL CANAL: Degenerative changes facet and uncinate arthropathy, as well as disc space narrowing and end plate hypertrophy. C3-C4 with mild canal and foraminal narrowing. C5-C6 with moderate foraminal narrowing. C6-C7 with moderate foraminal narrowing. No critical canal stenosis. PREVERTEBRAL SOFT TISSUES: No prevertebral soft tissue swelling. LUNG APICES: Imaged portion of the lung apices are within normal limits.   THORACIC SPINE: ALIGNMENT: Dextroscoliosis of the midthoracic spine. Facet joint alignment maintained. VERTEBRAE: Demineralization without acute compression deformity or fracture identified. SPINAL CANAL: Multilevel degenerative changes including intervertebral disc space narrowing and endplate hypertrophy. No critical spinal canal stenosis. PREVERTEBRAL SOFT TISSUES: No prevertebral soft tissue swelling.   Significant aortic and coronary artery calcifications identified. Probable atelectasis.   LUMBAR SPINE: ALIGNMENT: Minimal retrolisthesis of L1 on L2, L2 on L3 and L3 on L4. Minimal anterolisthesis of L4 on L5. Levoscoliosis centered about L2-L3. VERTEBRAE: Interval progression of endplate lucencies, lytic lesions and sclerosis along the inferior L3 vertebral body and superior L4 vertebral body without significant widening of the disc  space. Findings are progressed since CT pelvis and possibly new since prior MRI vertebral body heights are otherwise grossly maintained no acute displaced fracture identified. SPINAL CANAL: Significant degenerative changes are again noted at L2-L3 with loss of intervertebral disc space and endplate sclerosis, greater on the right. Multilevel facet hypertrophy. L3 L4 disc bulge and posterior ligamentous hypertrophy with mild-to-moderate canal narrowing and at least moderate lateral recess stenosis as well as moderate to severe foraminal stenosis. Severe foraminal stenosis at L2-L3. PREVERTEBRAL SOFT TISSUES: No prevertebral soft tissue swelling.   OTHER FINDINGS: None.       Interval progression of lucent/lytic/erosive changes involving the inferior L3 and superior L4 vertebral bodies without significant disc space narrowing. Findings are progressed from prior CT pelvis and appear new from prior MRI given differences in technique. Changes are indeterminate and could represent discitis/osteomyelitis, malignancy or less likely degenerative changes. Clinical correlation and follow-up MRI with contrast may be considered.   Otherwise no acute fracture or dislocation. No critical canal stenosis identified.   Degenerative changes most significant at L2-L3 and up to moderate canal narrowing suspected at L3-L4.   Mild scoliosis.   MACRO: None.   Signed by: Salud Fisher 1/9/2024 7:44 PM Dictation workstation:   [id number]    CT cervical spine wo IV contrast    Result Date: 1/9/2024  Interpreted By:  Salud Fisher, STUDY: CT CERVICAL SPINE WO IV CONTRAST; CT LUMBAR SPINE WO IV CONTRAST; CT THORACIC SPINE WO IV CONTRAST;  1/9/2024 6:47 pm; 1/9/2024 6:50 pm   INDICATION: Signs/Symptoms:frequent falls; Signs/Symptoms:low back pain leg weakness, frequent falls; Signs/Symptoms:frequent falls, pain.   COMPARISON: MRI lumbar spine 09/05/2023, CT pelvis 10/07/2023   ACCESSION NUMBER(S): [id number]; [id number]; [id number]    ORDERING CLINICIAN: GERSON BRYANT   TECHNIQUE: Axial noncontrast images of the cervical, thoracic and lumbar spine with coronal and sagittal reconstructed images.   FINDINGS: CERVICAL SPINE: ALIGNMENT: Minimal retrolisthesis of C3 on C4, C4 on C5 and C5 on C6. Facet joint and craniocervical line maintained. VERTEBRAE: Chronic endplates changes and subchondral cysts. Vertebral body heights are grossly maintained with demineralization. SPINAL CANAL: Degenerative changes facet and uncinate arthropathy, as well as disc space narrowing and end plate hypertrophy. C3-C4 with mild canal and foraminal narrowing. C5-C6 with moderate foraminal narrowing. C6-C7 with moderate foraminal narrowing. No critical canal stenosis. PREVERTEBRAL SOFT TISSUES: No prevertebral soft tissue swelling. LUNG APICES: Imaged portion of the lung apices are within normal limits.   THORACIC SPINE: ALIGNMENT: Dextroscoliosis of the midthoracic spine. Facet joint alignment maintained. VERTEBRAE: Demineralization without acute compression deformity or fracture identified. SPINAL CANAL: Multilevel degenerative changes including intervertebral disc space narrowing and endplate hypertrophy. No critical spinal canal stenosis. PREVERTEBRAL SOFT TISSUES: No prevertebral soft tissue swelling.   Significant aortic and coronary artery calcifications identified. Probable atelectasis.   LUMBAR SPINE: ALIGNMENT: Minimal retrolisthesis of L1 on L2, L2 on L3 and L3 on L4. Minimal anterolisthesis of L4 on L5. Levoscoliosis centered about L2-L3. VERTEBRAE: Interval progression of endplate lucencies, lytic lesions and sclerosis along the inferior L3 vertebral body and superior L4 vertebral body without significant widening of the disc space. Findings are progressed since CT pelvis and possibly new since prior MRI vertebral body heights are otherwise grossly maintained no acute displaced fracture identified. SPINAL CANAL: Significant degenerative changes are again  noted at L2-L3 with loss of intervertebral disc space and endplate sclerosis, greater on the right. Multilevel facet hypertrophy. L3 L4 disc bulge and posterior ligamentous hypertrophy with mild-to-moderate canal narrowing and at least moderate lateral recess stenosis as well as moderate to severe foraminal stenosis. Severe foraminal stenosis at L2-L3. PREVERTEBRAL SOFT TISSUES: No prevertebral soft tissue swelling.   OTHER FINDINGS: None.       Interval progression of lucent/lytic/erosive changes involving the inferior L3 and superior L4 vertebral bodies without significant disc space narrowing. Findings are progressed from prior CT pelvis and appear new from prior MRI given differences in technique. Changes are indeterminate and could represent discitis/osteomyelitis, malignancy or less likely degenerative changes. Clinical correlation and follow-up MRI with contrast may be considered.   Otherwise no acute fracture or dislocation. No critical canal stenosis identified.   Degenerative changes most significant at L2-L3 and up to moderate canal narrowing suspected at L3-L4.   Mild scoliosis.   MACRO: None.   Signed by: Salud Fisher 1/9/2024 7:44 PM Dictation workstation:   [id number]    CT head wo IV contrast    Result Date: 1/9/2024  Interpreted By:  Salud Fisher, STUDY: CT HEAD WO IV CONTRAST;  1/9/2024 6:47 pm   INDICATION: Signs/Symptoms:dizziness, frequent falls, head injury.   COMPARISON: 06/04/2016   ACCESSION NUMBER(S): [id number]   ORDERING CLINICIAN: GERSON BRYANT   TECHNIQUE: Axial noncontrast CT images of the head.   FINDINGS: BRAIN PARENCHYMA: Gray-white matter interfaces are preserved. No mass effect or midline shift. Generalized parenchymal volume loss noted with concordant ventricular enlargement. Non-specific white matter changes noted, which may be related to small vessel disease.   HEMORRHAGE: No acute intracranial hemorrhage. VENTRICLES and EXTRA-AXIAL SPACES: The ventricles, sulci and  basal cisterns enlarged, concordant with parenchymal volume loss. EXTRACRANIAL SOFT TISSUES: Within normal limits. PARANASAL SINUSES/MASTOIDS: The visualized paranasal sinuses and mastoid air cells are aerated. CALVARIUM: No depressed skull fracture. No destructive osseous lesion.   OTHER FINDINGS: None.       No acute intracranial hemorrhage or mass effect.     MACRO: None.   Signed by: Salud Fisher 1/9/2024 7:23 PM Dictation workstation:   [id number]    XR pelvis 1-2 views    Result Date: 1/9/2024  Interpreted By:  Salud Fisher, STUDY: XR PELVIS 1-2 VIEWS; ;  1/9/2024 6:34 pm   INDICATION: Signs/Symptoms:fall.   COMPARISON: None.   ACCESSION NUMBER(S): [id number]   ORDERING CLINICIAN: GERSON BRYANT   FINDINGS: AP pelvis   Degenerative changes of the bilateral hip joints. Scattered surgical clips in the medial upper thighs. Vascular calcifications noted.   No acute fracture, diastasis or dislocation identified.       No acute osseous abnormality identified.     MACRO: None   Signed by: Salud Fisher 1/9/2024 7:19 PM Dictation workstation:   [id number]    XR chest 1 view    Result Date: 1/9/2024  Interpreted By:  Salud Fisher, STUDY: XR CHEST 1 VIEW;  1/9/2024 6:34 pm   INDICATION: Signs/Symptoms:weakness.   COMPARISON: 01/21/2023   ACCESSION NUMBER(S): [id number]   ORDERING CLINICIAN: GERSON BRYANT   FINDINGS: AP radiograph of the chest was provided.       CARDIOMEDIASTINAL SILHOUETTE: Cardiomediastinal silhouette is normal in size and configuration. Atherosclerotic calcifications of the aortic arch.   LUNGS: No focal consolidation, pleural effusion or sizable pneumothorax seen.   ABDOMEN: No remarkable upper abdominal findings.   BONES: No acute osseous changes.       1.  No focal consolidation or sizeable pleural effusion.       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  He describes vertigo.  -Will use Antivert as needed.  -Neurology consult  -Fall precaution.  -Patient is awake, alert, oriented.  He has no neck stiffness or photophobia to suggest meningitis.  He does have infection of the lumbar spine but doubt meningitis  -Blood culture was obtained.  Patient will be on antibiotics anyways as below.  -Cervical spine MRI was obtained.  There is moderate spinal stenosis, and severe foraminal stenosis at multiple levels.  -We might consider brain MRI if dizzy spells/vertigo recurs.     2.  Near syncope:  -Slightly due to dizziness.  He did not lose any consciousness.  -Continue telemetry monitoring.  -Troponin was negative    3.  Generalized weakness.  He reported was doing well for a few days [facility].  He is using a walker at home.  However has more weakness in both upper and lower extremities.  This could be related to cervical spinal stenosis.  Patient does have discitis osteomyelitis of lumbar spine.    4.  Discitis osteomyelitis lumbar spine:  -History of osteomyelitis of the lumbar spine.  He reported was on antibiotics for about 6 weeks.  He was admitted at another facility for 5 weeks to get IV antibiotics.  Was discharged on oral antibiotics on 12/29/2023.  -Patient reported had [facility] biopsy [facility].  -Get infectious disease consult.  -Will resume IV antibiotics at this time with vancomycin and cefepime    5.  Diabetes mellitus with hyperglycemia:  -Will use sliding scale coverage for now.    6.  L3 transverse process fracture:  -Patient is not aware of the  fracture.  -Optimize pain control.      Bhumi Gonzales MD

## 2024-01-10 NOTE — SIGNIFICANT EVENT
Pt seen and examined. ID saw patient, rec continue iv abx, and initiate [facility]. [facility]. Neurology to see patient as well.

## 2024-01-11 ENCOUNTER — APPOINTMENT (OUTPATIENT)
Dept: RADIOLOGY | Facility: HOSPITAL | Age: 84
DRG: 552 | End: 2024-01-11
Payer: OTHER GOVERNMENT

## 2024-01-11 VITALS
HEIGHT: 68 IN | RESPIRATION RATE: 16 BRPM | DIASTOLIC BLOOD PRESSURE: 50 MMHG | HEART RATE: 84 BPM | SYSTOLIC BLOOD PRESSURE: 114 MMHG | WEIGHT: 175 LBS | BODY MASS INDEX: 26.52 KG/M2 | OXYGEN SATURATION: 97 % | TEMPERATURE: 98.8 F

## 2024-01-11 LAB
ANION GAP SERPL CALC-SCNC: 17 MMOL/L (ref 10–20)
BUN SERPL-MCNC: 25 MG/DL (ref 6–23)
CALCIUM SERPL-MCNC: 9.4 MG/DL (ref 8.6–10.3)
CHLORIDE SERPL-SCNC: 95 MMOL/L (ref 98–107)
CO2 SERPL-SCNC: 28 MMOL/L (ref 21–32)
CREAT SERPL-MCNC: 0.93 MG/DL (ref 0.5–1.3)
CRP SERPL HS-MCNC: 60.2 MG/L
EGFRCR SERPLBLD CKD-EPI 2021: 82 ML/MIN/1.73M*2
ERYTHROCYTE [DISTWIDTH] IN BLOOD BY AUTOMATED COUNT: 13.8 % (ref 11.5–14.5)
EST. AVERAGE GLUCOSE BLD GHB EST-MCNC: 171 MG/DL
GLUCOSE BLD MANUAL STRIP-MCNC: 189 MG/DL (ref 74–99)
GLUCOSE BLD MANUAL STRIP-MCNC: 190 MG/DL (ref 74–99)
GLUCOSE BLD MANUAL STRIP-MCNC: 362 MG/DL (ref 74–99)
GLUCOSE SERPL-MCNC: 219 MG/DL (ref 74–99)
HBA1C MFR BLD: 7.6 %
HCT VFR BLD AUTO: 34.2 % (ref 41–52)
HGB BLD-MCNC: 11.5 G/DL (ref 13.5–17.5)
HOLD SPECIMEN: NORMAL
MAGNESIUM SERPL-MCNC: 1.98 MG/DL (ref 1.6–2.4)
MCH RBC QN AUTO: 29.6 PG (ref 26–34)
MCHC RBC AUTO-ENTMCNC: 33.6 G/DL (ref 32–36)
MCV RBC AUTO: 88 FL (ref 80–100)
NRBC BLD-RTO: 0 /100 WBCS (ref 0–0)
PLATELET # BLD AUTO: 527 X10*3/UL (ref 150–450)
POTASSIUM SERPL-SCNC: 3.7 MMOL/L (ref 3.5–5.3)
PROT SERPL-MCNC: 5.9 G/DL (ref 6.4–8.2)
PROT UR-ACNC: 26 MG/DL (ref 5–25)
RBC # BLD AUTO: 3.89 X10*6/UL (ref 4.5–5.9)
SARS-COV-2 RNA RESP QL NAA+PROBE: NOT DETECTED
SODIUM SERPL-SCNC: 136 MMOL/L (ref 136–145)
T4 FREE SERPL-MCNC: 1.39 NG/DL (ref 0.61–1.12)
TSH SERPL-ACNC: 0.43 MIU/L (ref 0.44–3.98)
VIT B12 SERPL-MCNC: 701 PG/ML (ref 211–911)
WBC # BLD AUTO: 8.8 X10*3/UL (ref 4.4–11.3)

## 2024-01-11 PROCEDURE — 85027 COMPLETE CBC AUTOMATED: CPT | Performed by: INTERNAL MEDICINE

## 2024-01-11 PROCEDURE — A9575 INJ GADOTERATE MEGLUMI 0.1ML: HCPCS | Performed by: INTERNAL MEDICINE

## 2024-01-11 PROCEDURE — 2500000004 HC RX 250 GENERAL PHARMACY W/ HCPCS (ALT 636 FOR OP/ED): Performed by: INTERNAL MEDICINE

## 2024-01-11 PROCEDURE — 36415 COLL VENOUS BLD VENIPUNCTURE: CPT | Performed by: INTERNAL MEDICINE

## 2024-01-11 PROCEDURE — 2550000001 HC RX 255 CONTRASTS: Performed by: INTERNAL MEDICINE

## 2024-01-11 PROCEDURE — 83036 HEMOGLOBIN GLYCOSYLATED A1C: CPT | Mod: ELYLAB | Performed by: INTERNAL MEDICINE

## 2024-01-11 PROCEDURE — 70553 MRI BRAIN STEM W/O & W/DYE: CPT | Performed by: RADIOLOGY

## 2024-01-11 PROCEDURE — 82607 VITAMIN B-12: CPT | Performed by: INTERNAL MEDICINE

## 2024-01-11 PROCEDURE — 84166 PROTEIN E-PHORESIS/URINE/CSF: CPT | Performed by: INTERNAL MEDICINE

## 2024-01-11 PROCEDURE — 83735 ASSAY OF MAGNESIUM: CPT | Performed by: INTERNAL MEDICINE

## 2024-01-11 PROCEDURE — 84207 ASSAY OF VITAMIN B-6: CPT | Performed by: INTERNAL MEDICINE

## 2024-01-11 PROCEDURE — 99231 SBSQ HOSP IP/OBS SF/LOW 25: CPT

## 2024-01-11 PROCEDURE — 84156 ASSAY OF PROTEIN URINE: CPT | Performed by: INTERNAL MEDICINE

## 2024-01-11 PROCEDURE — 86325 OTHER IMMUNOELECTROPHORESIS: CPT | Performed by: INTERNAL MEDICINE

## 2024-01-11 PROCEDURE — 82525 ASSAY OF COPPER: CPT | Performed by: INTERNAL MEDICINE

## 2024-01-11 PROCEDURE — 2500000004 HC RX 250 GENERAL PHARMACY W/ HCPCS (ALT 636 FOR OP/ED)

## 2024-01-11 PROCEDURE — 84443 ASSAY THYROID STIM HORMONE: CPT | Performed by: INTERNAL MEDICINE

## 2024-01-11 PROCEDURE — 87635 SARS-COV-2 COVID-19 AMP PRB: CPT | Performed by: INTERNAL MEDICINE

## 2024-01-11 PROCEDURE — 97161 PT EVAL LOW COMPLEX 20 MIN: CPT | Mod: GP | Performed by: PHYSICAL THERAPIST

## 2024-01-11 PROCEDURE — 2500000001 HC RX 250 WO HCPCS SELF ADMINISTERED DRUGS (ALT 637 FOR MEDICARE OP): Performed by: INTERNAL MEDICINE

## 2024-01-11 PROCEDURE — 84166 PROTEIN E-PHORESIS/URINE/CSF: CPT | Mod: ELYLAB | Performed by: INTERNAL MEDICINE

## 2024-01-11 PROCEDURE — 84425 ASSAY OF VITAMIN B-1: CPT | Performed by: INTERNAL MEDICINE

## 2024-01-11 PROCEDURE — 84439 ASSAY OF FREE THYROXINE: CPT | Performed by: INTERNAL MEDICINE

## 2024-01-11 PROCEDURE — 70553 MRI BRAIN STEM W/O & W/DYE: CPT

## 2024-01-11 PROCEDURE — 84630 ASSAY OF ZINC: CPT | Performed by: INTERNAL MEDICINE

## 2024-01-11 PROCEDURE — 86334 IMMUNOFIX E-PHORESIS SERUM: CPT | Mod: ELYLAB | Performed by: INTERNAL MEDICINE

## 2024-01-11 PROCEDURE — 97165 OT EVAL LOW COMPLEX 30 MIN: CPT | Mod: GO

## 2024-01-11 PROCEDURE — 82947 ASSAY GLUCOSE BLOOD QUANT: CPT

## 2024-01-11 PROCEDURE — 86141 C-REACTIVE PROTEIN HS: CPT | Mod: ELYLAB | Performed by: INTERNAL MEDICINE

## 2024-01-11 PROCEDURE — 84446 ASSAY OF VITAMIN E: CPT | Performed by: INTERNAL MEDICINE

## 2024-01-11 PROCEDURE — 80048 BASIC METABOLIC PNL TOTAL CA: CPT | Performed by: INTERNAL MEDICINE

## 2024-01-11 RX ORDER — GADOTERATE MEGLUMINE 376.9 MG/ML
0.2 INJECTION INTRAVENOUS
Status: COMPLETED | OUTPATIENT
Start: 2024-01-11 | End: 2024-01-11

## 2024-01-11 RX ADMIN — GABAPENTIN 100 MG: 100 CAPSULE ORAL at 15:29

## 2024-01-11 RX ADMIN — LOSARTAN POTASSIUM 50 MG: 50 TABLET, FILM COATED ORAL at 08:02

## 2024-01-11 RX ADMIN — ASPIRIN 81 MG: 81 TABLET, COATED ORAL at 08:02

## 2024-01-11 RX ADMIN — GADOTERATE MEGLUMINE 16 ML: 376.9 INJECTION INTRAVENOUS at 13:47

## 2024-01-11 RX ADMIN — VANCOMYCIN HYDROCHLORIDE 1250 MG: 1.25 INJECTION, POWDER, LYOPHILIZED, FOR SOLUTION INTRAVENOUS at 01:40

## 2024-01-11 RX ADMIN — GEMFIBROZIL 600 MG: 600 TABLET ORAL at 06:27

## 2024-01-11 RX ADMIN — PANTOPRAZOLE SODIUM 40 MG: 40 TABLET, DELAYED RELEASE ORAL at 06:27

## 2024-01-11 RX ADMIN — INSULIN LISPRO 3 UNITS: 100 INJECTION, SOLUTION INTRAVENOUS; SUBCUTANEOUS at 08:02

## 2024-01-11 RX ADMIN — GABAPENTIN 100 MG: 100 CAPSULE ORAL at 08:02

## 2024-01-11 RX ADMIN — GEMFIBROZIL 600 MG: 600 TABLET ORAL at 16:07

## 2024-01-11 RX ADMIN — CEFEPIME 2 G: 2 INJECTION, POWDER, FOR SOLUTION INTRAVENOUS at 14:15

## 2024-01-11 RX ADMIN — AMLODIPINE BESYLATE 10 MG: 5 TABLET ORAL at 08:02

## 2024-01-11 RX ADMIN — CEFEPIME 2 G: 2 INJECTION, POWDER, FOR SOLUTION INTRAVENOUS at 01:40

## 2024-01-11 RX ADMIN — CARVEDILOL 6.25 MG: 6.25 TABLET, FILM COATED ORAL at 08:02

## 2024-01-11 RX ADMIN — INSULIN LISPRO 3 UNITS: 100 INJECTION, SOLUTION INTRAVENOUS; SUBCUTANEOUS at 12:21

## 2024-01-11 ASSESSMENT — COGNITIVE AND FUNCTIONAL STATUS - GENERAL
WALKING IN HOSPITAL ROOM: A LITTLE
CLIMB 3 TO 5 STEPS WITH RAILING: A LOT
STANDING UP FROM CHAIR USING ARMS: A LITTLE
DAILY ACTIVITIY SCORE: 16
MOBILITY SCORE: 17
MOVING TO AND FROM BED TO CHAIR: A LITTLE
WALKING IN HOSPITAL ROOM: A LITTLE
TOILETING: A LITTLE
DRESSING REGULAR UPPER BODY CLOTHING: A LITTLE
HELP NEEDED FOR BATHING: A LOT
DRESSING REGULAR LOWER BODY CLOTHING: A LOT
TURNING FROM BACK TO SIDE WHILE IN FLAT BAD: A LITTLE
PERSONAL GROOMING: A LITTLE
TOILETING: A LOT
HELP NEEDED FOR BATHING: A LITTLE
MOVING FROM LYING ON BACK TO SITTING ON SIDE OF FLAT BED WITH BEDRAILS: A LITTLE
MOVING TO AND FROM BED TO CHAIR: A LITTLE
DRESSING REGULAR LOWER BODY CLOTHING: A LITTLE
CLIMB 3 TO 5 STEPS WITH RAILING: A LOT
STANDING UP FROM CHAIR USING ARMS: A LITTLE
DRESSING REGULAR UPPER BODY CLOTHING: A LITTLE
TURNING FROM BACK TO SIDE WHILE IN FLAT BAD: A LITTLE
DAILY ACTIVITIY SCORE: 20
MOBILITY SCORE: 18

## 2024-01-11 ASSESSMENT — PAIN SCALES - GENERAL
PAINLEVEL_OUTOF10: 0 - NO PAIN

## 2024-01-11 ASSESSMENT — ACTIVITIES OF DAILY LIVING (ADL): BATHING_ASSISTANCE: MODERATE

## 2024-01-11 ASSESSMENT — PAIN - FUNCTIONAL ASSESSMENT: PAIN_FUNCTIONAL_ASSESSMENT: 0-10

## 2024-01-11 NOTE — CONSULTS
Inpatient consult to Neurology  Consult performed by: Ana Blackwood MD  Consult ordered by: Bhumi Gonzales MD          History Of Present Illness  Ant Alcala is a 83 y.o. male presenting with a history of lumbar discitis/osteomyelitis who presented with worsening gait instability and falls.    He has a history of recurrent hospitalizations starting October 2023 for acute onset low back pain. [facility] with IV antibiotics for discitis/osteomyelitis at L3. He does not recall any Neurology or neurosurgical consultation and he had no surgical intervention. He was discharged on PO antibiotics 12/19/2023. He does not recall what his PO antibiotics were and the MAR/home med list does not have them listed as well.     [facility], he has had progressively worsening gait instability. He feels wobbly upon standing, can only walk a few steps and then will fall. He denies new numbness or weakness of his arms or legs. He denies incoordination in his arms. He has been incontinent due to difficulty ambulating to the bathroom fast enough. He has no saddle anesthesia.     He has noticed mild slurred speech, denies facial droop, vision changes. He denies swallowing problems.     Past Medical History  History reviewed. No pertinent past medical history.  Surgical History  Past Surgical History:   Procedure Laterality Date    US GUIDED BIOPSY PROSTATE  8/21/2019    US GUIDED BIOPSY PROSTATE 8/21/2019 [facility]     Social History  Social History     Tobacco Use    Smoking status: Never    Smokeless tobacco: Never     Allergies  Patient has no known allergies.  Medications Prior to Admission   Medication Sig Dispense Refill Last Dose    acetaminophen (Tylenol) 325 mg tablet Take 2 tablets (650 mg) by mouth every 4 hours if needed for mild pain (1 - 3) or fever (temp greater than 38.0 C).       alogliptin (Nesina) 12.5 mg tablet Take 1 tablet (12.5 mg) by  mouth once daily.       amLODIPine (Norvasc) 10 mg tablet Take 1 tablet (10 mg) by mouth once daily.       aspirin 81 mg EC tablet Take 1 tablet (81 mg) by mouth once daily.       carvedilol (Coreg) 6.25 mg tablet Take 1 tablet (6.25 mg) by mouth 2 times a day.       chlorthalidone (Hygroton) 25 mg tablet Take 1 tablet (25 mg) by mouth once daily.       cyanocobalamin (Vitamin B-12) 1,000 mcg tablet Take 1 tablet (1,000 mcg) by mouth once daily.       cyclobenzaprine (Flexeril) 5 mg tablet Take 2 tablets (10 mg) by mouth 3 times a day as needed for muscle spasms.       DULoxetine (Cymbalta) 30 mg DR capsule Take 1 capsule (30 mg) by mouth once daily.       gabapentin (Neurontin) 100 mg capsule Take 1 capsule (100 mg) by mouth 3 times a day.       gemfibrozil (Lopid) 600 mg tablet Take 1 tablet (600 mg) by mouth 2 times a day.       losartan (Cozaar) 50 mg tablet Take 1 tablet (50 mg) by mouth 2 times a day.       atorvastatin (Lipitor) 80 mg tablet Take 0.5 tablets (40 mg) by mouth once daily.       lidocaine (Lidoderm) 5 % patch Place 1 patch over 12 hours on the skin once daily. Remove & discard patch within 12 hours or as directed by MD. 10 patch 0     lidocaine 4 % dressing Apply topically if needed for mild pain (1 - 3). 10 each 0        Review of Systems  Neurological Exam  Mental Status  Awake, alert and oriented to person, place and time. Mild dysarthria present. Able to name objects and repeat. Follows complex commands.    Cranial Nerves  CN II: Visual fields full to confrontation.  CN III, IV, VI: Extraocular movements intact bilaterally.  CN V: Facial sensation is normal.  CN VII:  Left: There is central facial weakness. Flattening of L NLF, asymmetric smile.  CN VIII: Hearing is normal.  CN IX, X: Palate elevates symmetrically  CN XI: Shoulder shrug strength is normal.  CN XII: Tongue midline without atrophy or fasciculations.    Motor   Strength is 5/5 throughout all four extremities.  Atrophy of the  "proximal lower extremities in the quadriceps, thighs and calves bilaterally .    Sensory  Light touch is normal in upper and lower extremities. Pinprick is normal in upper and lower extremities. Vibration is normal in upper and lower extremities. Vibration was 17/15 seconds at the great toes R/L. Proprioception is normal in upper and lower extremities.     Reflexes                                            Right                      Left  Brachioradialis                    2+                         2+  Biceps                                 2+                         2+  Triceps                                2+                         2+  Patellar                                0                         0  Achilles                                0                         0  Right Plantar: downgoing  Left Plantar: downgoing    Coordination  Right: Finger-to-nose abnormality: Mild RUE ataxia on FTN  Mod RLE ataxia on HTS. Heel-to-shin abnormality:Left: Finger-to-nose normal. Heel-to-shin normal.    Gait    Deferred due to instability and recent falls .    Physical Exam  Eyes:      Extraocular Movements: Extraocular movements intact.   Neurological:      Cranial Nerves: Dysarthria present.      Motor: Motor strength is normal.     Deep Tendon Reflexes:      Reflex Scores:       Tricep reflexes are 2+ on the right side and 2+ on the left side.       Bicep reflexes are 2+ on the right side and 2+ on the left side.       Brachioradialis reflexes are 2+ on the right side and 2+ on the left side.       Patellar reflexes are 0 on the right side and 0 on the left side.       Achilles reflexes are 0 on the right side and 0 on the left side.      Last Recorded Vitals  Blood pressure 117/60, pulse 76, temperature 36.6 °C (97.9 °F), resp. rate 16, height 1.727 m (5' 8\"), weight 79.4 kg (175 lb), SpO2 95 %.    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 Possible infarct or mass lesion in the brainstem/cerebellum  No myelopathy on exam. Also does not have a significant neuropathy (proprioception and vibration intact) though has hyporeflexia    Recommend MRI brain with and without contrast  Will check nutritional labs to evaluate for neuropathy or subacute combined degeneration, cerebellar dysfunction that can also cause this: B1, B6, B12, Vitamin E, coppper, zinc, TSH, SPEP, UPEP, and consider hemoglobin A1C    Neurology will continue to follow     Ana Blackwood MD

## 2024-01-11 NOTE — HOSPITAL COURSE
Ant Alcala is a 83 y.o. male presenting with dizziness, multiple falls and back pain.  Patient reported that his dizziness was sudden, usually when standing up using his walker.  He had multiple episodes of sudden dizziness since yesterday with some spinning sensation.  Patient also reported feeling weak in both legs and upper extremities so he sat down to avoid trauma.  He did not hit his head.  No loss of consciousness.  Patient denied fever, vomiting, diarrhea or shortness of breath.  Patient had history of cervical spinal stenosis.  Patient also has discitis osteomyelitis of lumbar spine.  [facility] infection.  He was discharged on 12/29/2023.  He is on oral antibiotics now. His labs shows no leukocytosis.  He has elevated C-reactive protein and ESR.  He has hyperglycemia 320 and mild elevation of lactate likely from dehydration. CT head was negative for acute intracranial pathology.  MRI cervical spine shows moderate central canal stenosis at C3-C6, with severe foraminal stenosis MRI thoracic spine shows evidence of schwannoma at right T12/L1.  MRI lumbar spine shows discitis osteomyelitis at the level L3/L4.  There is enhancing Schmorl node within the inferior endplate of L3.  There is right greater than left paravertebral psoas muscle edema at the level of L3/L4 related to discitis osteomyelitis no abscess collection. There is nondisplaced subacute fracture of the left L3 transverse process.  Patient was admitted and followed by neurology and infectious disease.  MRI of the brain is pending, nutritional workup was ordered.  Follow-up lumbar discitis and OM was treated with 6 weeks IV antibiotics last dose was December 29.  Patient was recently admitted with a fall from SNF.  ID recommendation continue IV Vanco and cefepime for 6 weeks and [facility].  Recommend neurosurgery consult post transfer.  Patient may need  repeat biopsy with bacterial and fungal and AFB stains and cultures.  At this time patient was accepted to [facility] and will be transferred for further treatment.  Pickup scheduled for 4:00 PM.  Patient will be under Dr. Lai.  On day of discharge patient hemodynamically stable.

## 2024-01-11 NOTE — PROGRESS NOTES
Occupational Therapy    Evaluation/Treatment    Patient Name: Ant Alcala  MRN: [id number]  : 1940  Today's Date: 24  Time Calculation  Start Time: 853  Stop Time: 905  Time Calculation (min): 12 min       Assessment:  End of Session Patient Position: Bed, 1 rail up, Alarm on  OT Assessment Results: Decreased ADL status, Decreased endurance, Decreased functional mobility    Plan:  Treatment Interventions: ADL retraining, Functional transfer training, Endurance training  OT Frequency: 2 times per week  OT Discharge Recommendations: Moderate intensity level of continued care  OT Recommended Transfer Status: Moderate assist  OT - OK to Discharge: Yes (when medically stable)  Treatment Interventions: ADL retraining, Functional transfer training, Endurance training  Subjective               General:   OT Received On: 24  General  Reason for Referral: ADL impairment  Referred By: PT/OT 1/10/24 Clemente  Past Medical History Relevant to Rehab: DM, osteomyelitis lumbar spine  Family/Caregiver Present: Yes (spouse present)  Patient Position Received: Bed, 2 rail up, Alarm off, not on at start of session  General Comment: To ED 24 for worenign LBP and weakness with frequent falls. [facility]. Home x 1 week. HHC not started. According to spouse 1st few days good. CT 24 Thoracic/lumbar Interval progression of lucency L3 + 4; Head (-); Pelvis (-) Fx; MRI 24 Cervical/thoracic/lumbar Severe left foraminal  stenosis C6-7; Dsicitis/osteomyelitis L3,4, Moderate foraminal stenosis Bilateral L4,5; XR 24 Pelvis (-)    Precautions:  Medical Precautions: Spinal precautions, Fall precautions           Pain:  Pain Assessment  Pain Score:  (back pain, 2/10)  Objective     Cognition:  Orientation Level: Oriented X4             Home Living:  Home Living Comments: Per patient report resides with spouse in 1 story with basement (laundry( with handrail 2 or  "3 steps  with handrail to enter depending on entrance. Does not drive. 4 falls since discharged home related to dizziness.    Prior Function:  Prior Function Comments: Prior to [facility] independent in mobility and ADLs. Spouse completed IADLs.           Activities of Daily Living:   Eating Assistance: Independent  Grooming Assistance: Stand by  Bathing Assistance: Moderate  UE Dressing Assistance: Stand by  LE Dressing Assistance: Moderate  Toileting Assistance with Device: Moderate  Functional Assistance:  pt ambulated with min A with ww                         Activity Tolerance:  Endurance:  (limited by fatigue and weakness)         Bed Mobility/Transfers: Bed Mobility  Bed Mobility:  (Supine > sit + 1 CGA, attempted to instruct patient in \"logroll\" technique with poor followthrough. Sit > side lying > supine CGA tends to go from sit right to supine without lying on side. Poor follow through with instructions.)  Transfers  Transfer:  (Sit <> stand at bed level vc/tc for hand position with ww)                Balance:  Static Sitting: good  Dynamic Sitting: fair   Static Standing: fair -  Dynamic Standing: fair -         Vision: Basic Assessment  Current Vision: No visual deficits        Sensation:  Sensation Comment: Denies any deficits in light touch         Coordination:  Movements are Fluid and Coordinated: Yes       Extremities: RUE   RUE : Within Functional Limits and LUE   LUE: Within Functional Limits    Outcome Measures: [facility] Daily Activity  Putting on and taking off regular lower body clothing: A lot  Bathing (including washing, rinsing, drying): A lot  Putting on and taking off regular upper body clothing: A little  Toileting, which includes using toilet, bedpan or urinal: A lot  Taking care of personal grooming such as brushing teeth: A little  Eating Meals: None  Daily Activity - Total Score: 16    Education Documentation  ADL Training, taught by Rosa Burr OT at 1/11/2024  1:54 " PM.  Learner: Patient  Readiness: Acceptance  Method: Explanation  Response: Verbalizes Understanding, Needs Reinforcement                 Goals:  Encounter Problems       Encounter Problems (Active)       OT Goals       pt will dress LB with modified indep (Progressing)       Start:  01/11/24    Expected End:  01/25/24            Pt will transfer to bed, chair, toilet with SBA (Progressing)       Start:  01/11/24    Expected End:  01/25/24            Pt will demo fair + dyn std balance with ADLS (Progressing)       Start:  01/11/24    Expected End:  01/25/24

## 2024-01-11 NOTE — PROGRESS NOTES
Nutrition Progress Note    Notified by Rachell nutrition  that pt would like Glucerna BID, ordered. Please consult RD if need for full nutritional assessment desired.

## 2024-01-11 NOTE — DISCHARGE INSTRUCTIONS
[facility]    Thank you for choosing [facility]. It has been a pleasure taking part in your medical care. Please follow up with your primary care provider as instructed. If your symptoms should persist or worsen, please contact your primary care physician, or in the case of an emergency proceed to the nearest Emergency Room for further care. If you have any questions about the care you received, please call [facility] at [phone]. Thank you again! JAMIE Walker

## 2024-01-11 NOTE — PROGRESS NOTES
Physical Therapy    Physical Therapy Evaluation    Patient Name: Ant Alcala  MRN: [id number]  Today's Date: 1/11/2024   Time Calculation  Start Time: 0856  Stop Time: 0906  Time Calculation (min): 10 min    Assessment/Plan   PT Assessment  PT Assessment Results: Decreased strength, Impaired balance, Decreased endurance, Decreased mobility, Decreased safety awareness  Rehab Prognosis: Good  Evaluation/Treatment Tolerance: Patient tolerated treatment well  Assessment Comment: Patient has recently completed [place] [facility] [place] [facility], however demonstrates no awareness of spinal precautions or safe hand position for sit <> stand. Note confusion upon arrival to room. Unclear as to his baseline. Will benefit from continued PT to educate n safe methods and improve mobiltiy.  End of Session Patient Position: Bed, 1 rail up, Alarm on  IP OR SWING BED PT PLAN  Inpatient or Swing Bed: Inpatient  PT Plan  Treatment/Interventions: Bed mobility, Transfer training, Gait training, Balance training, Strengthening, Therapeutic exercise, Therapeutic activity  PT Plan: Skilled PT  PT Frequency: 3 times per week  PT Discharge Recommendations:  (Continued PT at low to moderate intensity and moderate frequency with 24/7 care)  PT Recommended Transfer Status: Assist x1  PT - OK to Discharge: with continued PT and 24/7 care once deemed medically appropriate)      Subjective   General Visit Information:  General  Reason for Referral: Impaired mobility  Referred By: PT/OT 1/10/24 Clemente  Past Medical History Relevant to Rehab: DM, osteomyelitis lumbar spine  Family/Caregiver Present:  (spouse present)  Patient Position Received: Bed, 2 rail up, Alarm off, not on at start of session  General Comment: To ED 1/9/24 for worenign LBP and weakness with frequent falls. [facility]. Home x 1 week. HHC not started. According to spouse 1st few days good. CT 1/9/24 Thoracic/lumbar Interval  "progression of lucency L3 + 4; Head (-); Pelvis (-) Fx; MRI 1/9/24 Cervical/thoracic/lumbar Severe left foraminal  stenosis C6-7; Dsicitis/osteomyelitis L3,4, Moderate foraminal stenosis Bilateral L4,5; XR 1//9/24 Pelvis (-)  Home Living:  Home Living  Home Living Comments: Per patient report resides with spouse in 1 story with basement (laundry( with handrail 2 or 3 steps  with handrail to enter depending on entrance. Does not drive. 4 falls since discharged home related to dizziness.  Prior Level of Function:  Prior Function Per Pt/Caregiver Report  Prior Function Comments: [facility] to [facility] independent in mobitly and ADLs. Spouse completed IADLs.  Precautions:  Precautions  Medical Precautions: Spinal precautions, Fall precautions    Objective   Pain:  Pain Assessment  Pain Score: 0 - No pain  Cognition:  Cognition  Overall Cognitive Status:  (Confused)  Orientation Level: Oriented X4    General Assessments:  General Observation  General Observation: Patient lying in bed with external catheter in place. Patient requires much encouragement and reassurance to participate.   Activity Tolerance  Endurance:  (Limited by fatigue and weakness)    Sensation  Sensation Comment: Denies any deficits in light touch    Coordination  Movements are Fluid and Coordinated: Yes  Alternating Toe Taps: Intact  Heel to Shin: Intact    Postural Control  Posture Comment: Presents with forward head and decreased lumbar lordosis    Static Sitting Balance  Static Sitting-Comment/Number of Minutes: Good  Dynamic Sitting Balance  Dynamic Sitting-Comments: Good    Static Standing Balance  Static Standing-Comment/Number of Minutes: Far +  Dynamic Standing Balance  Dynamic Standing-Comments: Fair  Functional Assessments:  Bed Mobility  Bed Mobility:  (Supine > sit + 1 CGA, attempted to instruct patient in \"logroll\" technique with poor followthrough. Sit > side lying > supine CGA tends to go from sit right to supine without lying on " side. Poor follow through with instructions.)    Transfers  Transfer:  (Sit <> stand at bed level vc/tc for hand position. with ww)    Ambulation/Gait Training  Ambulation/Gait Training Performed:  (Patient able to ambulate 3steps to right and then back to left and to right again and then  forward and backward using ww + 2 minimal assist. Denied any dizziness except for 1 step forward/backward.)  Extremity/Trunk Assessments:  RUE   RUE : Within Functional Limits  LUE   LUE: Within Functional Limits  RLE   RLE :  (AROM; Hip/knee/ankle WFL MMT 5/5 grossly)  LLE   LLE :  (AROM; Hip/knee/ankle WFL MMT 5/5 grossly)  Outcome Measures:  [facility] Basic Mobility  Turning from your back to your side while in a flat bed without using bedrails: A little  Moving from lying on your back to sitting on the side of a flat bed without using bedrails: A little  Moving to and from bed to chair (including a wheelchair): A little  Standing up from a chair using your arms (e.g. wheelchair or bedside chair): A little  To walk in hospital room: A little  Climbing 3-5 steps with railing: A lot  Basic Mobility - Total Score: 17    Encounter Problems       Encounter Problems (Active)       PT Problem       Supine < > side lying <> sit modified independent  (Progressing)       Start:  01/11/24    Expected End:  01/25/24            Transfers sit <> stand with ww modified independent  (Progressing)       Start:  01/11/24    Expected End:  01/25/24            Patient to ambulate with ww 50' modifed independent  (Progressing)       Start:  01/11/24    Expected End:  01/25/24               Pain - Adult              Education Documentation  Precautions, taught by uJlita London, PT at 1/11/2024 12:24 PM.  Learner: Patient  Readiness: Nonacceptance  Method: Explanation, Demonstration  Response: Needs Reinforcement  Comment: Attempted to instruct patient in log  roll for supine <> sit with poor follow through. Also educated in  hand position for sit <>  stand    Mobility Training, taught by Julita London, PT at 1/11/2024 12:24 PM.  Learner: Patient  Readiness: Nonacceptance  Method: Explanation, Demonstration  Response: Needs Reinforcement  Comment: Attempted to instruct patient in log  roll for supine <> sit with poor follow through. Also educated in  hand position for sit <> stand

## 2024-01-11 NOTE — PROGRESS NOTES
"Ant Alcala is a 83 y.o. male on day 1 of admission presenting with Intractable low back pain.      Subjective   Pt. In bed, mild dysarthria and left facial droop present. He slept ok overnight. MRI w/wo is pending. No word of [facility] of [facility].      Review of systems are negative unless otherwise specified in HPI.     Objective     Last Recorded Vitals  Blood pressure 119/90, pulse 85, temperature 36.3 °C (97.3 °F), resp. rate 16, height 1.727 m (5' 8\"), weight 79.4 kg (175 lb), SpO2 95 %.    Physical Exam  Eyes:      Pupils: Pupils are equal, round, and reactive to light.   Neurological:      Cranial Nerves: Dysarthria present.      Motor: Motor strength is normal.      Neurological Exam  Mental Status  Awake, alert and oriented to person, place and time. Mild dysarthria present. Language is fluent with no aphasia. Attention and concentration are normal.    Cranial Nerves  CN III, IV, VI: Pupils equal round and reactive to light bilaterally.  CN VII:  Left: There is central facial weakness.  And EOM's Normal.    Motor   Strength is 5/5 throughout all four extremities.    Sensory  Light touch is normal in upper and lower extremities.       Relevant Results  Results for orders placed or performed during the hospital encounter of 01/09/24 (from the past 24 hour(s))   POCT GLUCOSE   Result Value Ref Range    POCT Glucose 195 (H) 74 - 99 mg/dL   Vancomycin   Result Value Ref Range    Vancomycin 8.0 5.0 - 20.0 ug/mL   SST TOP   Result Value Ref Range    Extra Tube Hold for add-ons.    POCT GLUCOSE   Result Value Ref Range    POCT Glucose 217 (H) 74 - 99 mg/dL   POCT GLUCOSE   Result Value Ref Range    POCT Glucose 162 (H) 74 - 99 mg/dL   Magnesium   Result Value Ref Range    Magnesium 1.98 1.60 - 2.40 mg/dL   Basic Metabolic Panel   Result Value Ref Range    Glucose 219 (H) 74 - 99 mg/dL    Sodium 136 136 - 145 mmol/L    Potassium 3.7 3.5 - 5.3 mmol/L    Chloride 95 (L) 98 - 107 mmol/L    Bicarbonate 28 21 - 32 " mmol/L    Anion Gap 17 10 - 20 mmol/L    Urea Nitrogen 25 (H) 6 - 23 mg/dL    Creatinine 0.93 0.50 - 1.30 mg/dL    eGFR 82 >60 mL/min/1.73m*2    Calcium 9.4 8.6 - 10.3 mg/dL   CBC   Result Value Ref Range    WBC 8.8 4.4 - 11.3 x10*3/uL    nRBC 0.0 0.0 - 0.0 /100 WBCs    RBC 3.89 (L) 4.50 - 5.90 x10*6/uL    Hemoglobin 11.5 (L) 13.5 - 17.5 g/dL    Hematocrit 34.2 (L) 41.0 - 52.0 %    MCV 88 80 - 100 fL    MCH 29.6 26.0 - 34.0 pg    MCHC 33.6 32.0 - 36.0 g/dL    RDW 13.8 11.5 - 14.5 %    Platelets 527 (H) 150 - 450 x10*3/uL   Vitamin B12   Result Value Ref Range    Vitamin B12 701 211 - 911 pg/mL   TSH with reflex to Free T4 if abnormal   Result Value Ref Range    Thyroid Stimulating Hormone 0.43 (L) 0.44 - 3.98 mIU/L   Thyroxine, Free   Result Value Ref Range    Thyroxine, Free 1.39 (H) 0.61 - 1.12 ng/dL   Lavender Top   Result Value Ref Range    Extra Tube Hold for add-ons.    Green Top   Result Value Ref Range    Extra Tube Hold for add-ons.    SST TOP   Result Value Ref Range    Extra Tube Hold for add-ons.    PST Top   Result Value Ref Range    Extra Tube Hold for add-ons.    POCT GLUCOSE   Result Value Ref Range    POCT Glucose 190 (H) 74 - 99 mg/dL   MR lumbar spine w and wo IV contrast    Result Date: 1/9/2024  Interpreted By:  Fede Holcomb, STUDY: MR CERVICAL SPINE W AND WO IV CONTRAST; MR THORACIC SPINE W AND WO IV CONTRAST; MR LUMBAR SPINE W AND WO IV CONTRAST;  1/9/2024 10:06 pm   INDICATION: Signs/Symptoms:recent spinal infection worsening back pain leg weakness.   COMPARISON: CT cervical and thoracic and lumbar spine dated 01/09/2024.  MR lumbar spine dated 09/05/2023.   ACCESSION NUMBER(S): [id number]; [id number]; [id number]   ORDERING CLINICIAN: GERSON BRYANT   TECHNIQUE: Pre and post contrast multiplanar and multisequence images were acquired through the cervical spine, thoracic spine, and lumbar spine. Post contrast images were obtained after administration of 16 mL of Dotarem intravenous  contrast.   FINDINGS:   Cervical spine:   Alignment: There is mild degenerative C3 over C4 retrolisthesis and C5 over C6.   Vertebrae/Intervertebral Discs: The vertebral body heights are maintained. There is mildly heterogeneous bone marrow signal without evidence of focal suspicious osseous lesions. There is diffuse degenerative disc space narrowing from C3 through C7.   There are no epidural collections.   Cord: Normal spinal cord signal and contour.   C1-C2: The cervicomedullary junction appears unremarkable. There is no central canal stenosis.   C2-C3: Small disc bulge, contributing to partial effacement of the thecal sac. No foraminal stenosis.   C3-C4: Central disc osteophyte complex, mild retrolisthesis, with posterolateral osseous spurring results in complete effacement of the thecal sac with moderate central canal stenosis and moderate bilateral foraminal stenosis.   C4-C5: Mild central disc osteophyte complex and ligamentum flavum infolding contributes to mild central canal stenosis and mild bilateral foraminal stenosis.   C5-C6: There is a central disc osteophyte complex algorithm flavum infolding resulting in moderate central canal stenosis with almost complete effacement of the thecal sac. There is severe left and moderate right foraminal stenosis due to posterolateral osseous spurring.   C6-C7: Small disc osteophyte complex. No significant central canal stenosis. There is moderate bilateral foraminal stenosis due to posterolateral osseous spurring.   C7-T1: There is no significant disc bulge or herniation. There is no significant central canal or neural foraminal stenosis.   Thoracic spine: Normal thoracic kyphosis. The spinal cord is normal in signal and contour. Vertebral body heights  are maintained. There is multifocal fatty bone marrow change in the upper and midthoracic spine without focal bone marrow lesions. There is also multilevel degenerative disc space narrowing in the upper and midthoracic  spine.   There are small central disc osteophyte complexes at T2-T3, T3-T4, T6-T7 contributing to mild central canal stenosis at these levels. There is multilevel degenerative disc space narrowing in the midthoracic spine. There is no evidence of high-grade canal stenosis, spinal cord signal or contour abnormality in the thoracic spine. No evidence of abnormal enhancement within the spinal canal.   Lumbar spine: There is straightening of lumbar lordosis. Minimal degenerative anterolisthesis of L4 over L5, and degenerative retrolisthesis of L2 over L3 similar to prior. There is interval development of extensive bone marrow edema and intra discal edema at L3-L4 since September 2022. There is an enhancing rounded Schmorl node within the central inferior endplate of L3 extending into the disc space. There is focal enhancement and edema of the left L3 transverse process, axial image 22 of 46 consistent with a nondisplaced fracture.   There is focal T1 hypointense enhancing bone marrow lesion within the right iliac bone unchanged from prior, axial image 40 of 46, which is nonspecific and may represent an atypical hemangioma, axial image 40 of 46.   Previously seen T1 hypointense T2 hyperintense nodule within the right T12-L1 neural foramen is unchanged in size and demonstrates diffuse enhancement, measuring 1.1 x 0.7 cm, likely representing a nerve root schwannoma.   T12-L2: No significant disc bulge or herniation. No significant central canal or foraminal stenosis. Note there is an enhancing homogeneous nodule within the right T12-L1 neural foramen consistent with a schwannoma. L2-L3: Mild retrolisthesis of L3 over L4 and facet hypertrophy without central canal stenosis. There is mild-to-moderate bilateral foraminal stenosis due to osseous spurring similar to prior. L3-L4: There is ventral epidural enhancement posterior to the L3 and L4 vertebral bodies which may represent reactive venous epidural plexus versus phlegmon.  There is also enhancing disc material in the ventral epidural space measuring approximately 1.0 cm craniocaudally and point 6 cm in anterior-posterior dimension, sagittal image 10 of 19. This contributes to mild left subarticular stenosis without significant central canal stenosis. There is moderate to severe lbilateral foraminal stenosis due to progressing disc space narrowing with disc bulge and facet hypertrophy. There is increasing left foraminal disc protrusion. There is mild enhancement of the bilateral facets with small right-sided facet effusion, unchanged from September 2023, likely degenerative. L4-L5: There is similar moderate right greater than left foraminal stenosis due to facet hypertrophy and disc bulge. There is enhancing right-sided facet effusion, likely degenerative similar to prior. L5-S1: No significant disc bulge or herniation. No significant canal stenosis.     Other:         Cervical spine: No evidence of focal suspicious bone marrow lesions or discitis osteomyelitis in the cervical spine. Multilevel uncal and synovial facet arthropathy in the cervical spine with resultant moderate central canal stenosis at C3-C4 and C5-C6. There is severe left foraminal stenosis at C6-C7 due to posterolateral osseous spurring.   Thoracic spine: Endplate degenerative change throughout the upper and midthoracic spine without high-grade canal stenosis, bone marrow edema, or spinal cord abnormality.   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 There is posterior central enhancing soft tissue at the level of L3-L4 consistent with phlegmonous change, without evidence of significant central canal stenosis at this level or epidural abscess. There is an enhancing Schmorl node within the inferior endplate of L3 with extension into the disc space. There is right greater than left paravertebral psoas muscle edema at the level of L3-L4 related to the discitis osteomyelitis, without evidence of a fluid collection.  There is interval progression of bilateral foraminal stenosis at L3-L4 due to increased disc space narrowing with diffuse disc bulge and facet hypertrophy.   Unchanged moderate degenerative bilateral foraminal stenosis at L4-L5. No evidence of significant central canal stenosis.   Previously seen enhancing soft tissue within the right T12-L1 neural foramen is unchanged in size and demonstrates homogeneous enhancement consistent with a schwannoma. There is otherwise no abnormal leptomeningeal enhancement. Nonspecific unchanged in size enhancing bone marrow lesion within the right posterior ilium which may possibly represent an atypical hemangioma. Attention on follow-up is recommended.   Nondisplaced subacute fracture of the left L3 transverse process.   Signed by: Fede Holcomb 1/9/2024 11:34 PM Dictation workstation:   [id number]    MR thoracic spine w and wo IV contrast    Result Date: 1/9/2024  Interpreted By:  Fede Holcomb, STUDY: MR CERVICAL SPINE W AND WO IV CONTRAST; MR THORACIC SPINE W AND WO IV CONTRAST; MR LUMBAR SPINE W AND WO IV CONTRAST;  1/9/2024 10:06 pm   INDICATION: Signs/Symptoms:recent spinal infection worsening back pain leg weakness.   COMPARISON: CT cervical and thoracic and lumbar spine dated 01/09/2024.  MR lumbar spine dated 09/05/2023.   ACCESSION NUMBER(S): [id number]; [id number]; [id number]   ORDERING CLINICIAN: GERSON BRYANT   TECHNIQUE: Pre and post contrast multiplanar and multisequence images were acquired through the cervical spine, thoracic spine, and lumbar spine. Post contrast images were obtained after administration of 16 mL of Dotarem intravenous contrast.   FINDINGS:   Cervical spine:   Alignment: There is mild degenerative C3 over C4 retrolisthesis and C5 over C6.   Vertebrae/Intervertebral Discs: The vertebral body heights are maintained. There is mildly heterogeneous bone marrow signal without evidence of focal suspicious osseous lesions. There is diffuse  degenerative disc space narrowing from C3 through C7.   There are no epidural collections.   Cord: Normal spinal cord signal and contour.   C1-C2: The cervicomedullary junction appears unremarkable. There is no central canal stenosis.   C2-C3: Small disc bulge, contributing to partial effacement of the thecal sac. No foraminal stenosis.   C3-C4: Central disc osteophyte complex, mild retrolisthesis, with posterolateral osseous spurring results in complete effacement of the thecal sac with moderate central canal stenosis and moderate bilateral foraminal stenosis.   C4-C5: Mild central disc osteophyte complex and ligamentum flavum infolding contributes to mild central canal stenosis and mild bilateral foraminal stenosis.   C5-C6: There is a central disc osteophyte complex algorithm flavum infolding resulting in moderate central canal stenosis with almost complete effacement of the thecal sac. There is severe left and moderate right foraminal stenosis due to posterolateral osseous spurring.   C6-C7: Small disc osteophyte complex. No significant central canal stenosis. There is moderate bilateral foraminal stenosis due to posterolateral osseous spurring.   C7-T1: There is no significant disc bulge or herniation. There is no significant central canal or neural foraminal stenosis.   Thoracic spine: Normal thoracic kyphosis. The spinal cord is normal in signal and contour. Vertebral body heights  are maintained. There is multifocal fatty bone marrow change in the upper and midthoracic spine without focal bone marrow lesions. There is also multilevel degenerative disc space narrowing in the upper and midthoracic spine.   There are small central disc osteophyte complexes at T2-T3, T3-T4, T6-T7 contributing to mild central canal stenosis at these levels. There is multilevel degenerative disc space narrowing in the midthoracic spine. There is no evidence of high-grade canal stenosis, spinal cord signal or contour abnormality in  the thoracic spine. No evidence of abnormal enhancement within the spinal canal.   Lumbar spine: There is straightening of lumbar lordosis. Minimal degenerative anterolisthesis of L4 over L5, and degenerative retrolisthesis of L2 over L3 similar to prior. There is interval development of extensive bone marrow edema and intra discal edema at L3-L4 since September 2022. There is an enhancing rounded Schmorl node within the central inferior endplate of L3 extending into the disc space. There is focal enhancement and edema of the left L3 transverse process, axial image 22 of 46 consistent with a nondisplaced fracture.   There is focal T1 hypointense enhancing bone marrow lesion within the right iliac bone unchanged from prior, axial image 40 of 46, which is nonspecific and may represent an atypical hemangioma, axial image 40 of 46.   Previously seen T1 hypointense T2 hyperintense nodule within the right T12-L1 neural foramen is unchanged in size and demonstrates diffuse enhancement, measuring 1.1 x 0.7 cm, likely representing a nerve root schwannoma.   T12-L2: No significant disc bulge or herniation. No significant central canal or foraminal stenosis. Note there is an enhancing homogeneous nodule within the right T12-L1 neural foramen consistent with a schwannoma. L2-L3: Mild retrolisthesis of L3 over L4 and facet hypertrophy without central canal stenosis. There is mild-to-moderate bilateral foraminal stenosis due to osseous spurring similar to prior. L3-L4: There is ventral epidural enhancement posterior to the L3 and L4 vertebral bodies which may represent reactive venous epidural plexus versus phlegmon. There is also enhancing disc material in the ventral epidural space measuring approximately 1.0 cm craniocaudally and point 6 cm in anterior-posterior dimension, sagittal image 10 of 19. This contributes to mild left subarticular stenosis without significant central canal stenosis. There is moderate to severe  lbilateral foraminal stenosis due to progressing disc space narrowing with disc bulge and facet hypertrophy. There is increasing left foraminal disc protrusion. There is mild enhancement of the bilateral facets with small right-sided facet effusion, unchanged from September 2023, likely degenerative. L4-L5: There is similar moderate right greater than left foraminal stenosis due to facet hypertrophy and disc bulge. There is enhancing right-sided facet effusion, likely degenerative similar to prior. L5-S1: No significant disc bulge or herniation. No significant canal stenosis.     Other:         Cervical spine: No evidence of focal suspicious bone marrow lesions or discitis osteomyelitis in the cervical spine. Multilevel uncal and synovial facet arthropathy in the cervical spine with resultant moderate central canal stenosis at C3-C4 and C5-C6. There is severe left foraminal stenosis at C6-C7 due to posterolateral osseous spurring.   Thoracic spine: Endplate degenerative change throughout the upper and midthoracic spine without high-grade canal stenosis, bone marrow edema, or spinal cord abnormality.   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 There is posterior central enhancing soft tissue at the level of L3-L4 consistent with phlegmonous change, without evidence of significant central canal stenosis at this level or epidural abscess. There is an enhancing Schmorl node within the inferior endplate of L3 with extension into the disc space. There is right greater than left paravertebral psoas muscle edema at the level of L3-L4 related to the discitis osteomyelitis, without evidence of a fluid collection. There is interval progression of bilateral foraminal stenosis at L3-L4 due to increased disc space narrowing with diffuse disc bulge and facet hypertrophy.   Unchanged moderate degenerative bilateral foraminal stenosis at L4-L5. No evidence of significant central canal stenosis.   Previously seen enhancing soft  tissue within the right T12-L1 neural foramen is unchanged in size and demonstrates homogeneous enhancement consistent with a schwannoma. There is otherwise no abnormal leptomeningeal enhancement. Nonspecific unchanged in size enhancing bone marrow lesion within the right posterior ilium which may possibly represent an atypical hemangioma. Attention on follow-up is recommended.   Nondisplaced subacute fracture of the left L3 transverse process.   Signed by: Fede Holcomb 1/9/2024 11:34 PM Dictation workstation:   [id number]    MR cervical spine w and wo IV contrast    Result Date: 1/9/2024  Interpreted By:  Fede Holcomb, STUDY: MR CERVICAL SPINE W AND WO IV CONTRAST; MR THORACIC SPINE W AND WO IV CONTRAST; MR LUMBAR SPINE W AND WO IV CONTRAST;  1/9/2024 10:06 pm   INDICATION: Signs/Symptoms:recent spinal infection worsening back pain leg weakness.   COMPARISON: CT cervical and thoracic and lumbar spine dated 01/09/2024.  MR lumbar spine dated 09/05/2023.   ACCESSION NUMBER(S): [id number]; [id number]; [id number]   ORDERING CLINICIAN: GERSON BRYANT   TECHNIQUE: Pre and post contrast multiplanar and multisequence images were acquired through the cervical spine, thoracic spine, and lumbar spine. Post contrast images were obtained after administration of 16 mL of Dotarem intravenous contrast.   FINDINGS:   Cervical spine:   Alignment: There is mild degenerative C3 over C4 retrolisthesis and C5 over C6.   Vertebrae/Intervertebral Discs: The vertebral body heights are maintained. There is mildly heterogeneous bone marrow signal without evidence of focal suspicious osseous lesions. There is diffuse degenerative disc space narrowing from C3 through C7.   There are no epidural collections.   Cord: Normal spinal cord signal and contour.   C1-C2: The cervicomedullary junction appears unremarkable. There is no central canal stenosis.   C2-C3: Small disc bulge, contributing to partial effacement of the thecal sac. No  foraminal stenosis.   C3-C4: Central disc osteophyte complex, mild retrolisthesis, with posterolateral osseous spurring results in complete effacement of the thecal sac with moderate central canal stenosis and moderate bilateral foraminal stenosis.   C4-C5: Mild central disc osteophyte complex and ligamentum flavum infolding contributes to mild central canal stenosis and mild bilateral foraminal stenosis.   C5-C6: There is a central disc osteophyte complex algorithm flavum infolding resulting in moderate central canal stenosis with almost complete effacement of the thecal sac. There is severe left and moderate right foraminal stenosis due to posterolateral osseous spurring.   C6-C7: Small disc osteophyte complex. No significant central canal stenosis. There is moderate bilateral foraminal stenosis due to posterolateral osseous spurring.   C7-T1: There is no significant disc bulge or herniation. There is no significant central canal or neural foraminal stenosis.   Thoracic spine: Normal thoracic kyphosis. The spinal cord is normal in signal and contour. Vertebral body heights  are maintained. There is multifocal fatty bone marrow change in the upper and midthoracic spine without focal bone marrow lesions. There is also multilevel degenerative disc space narrowing in the upper and midthoracic spine.   There are small central disc osteophyte complexes at T2-T3, T3-T4, T6-T7 contributing to mild central canal stenosis at these levels. There is multilevel degenerative disc space narrowing in the midthoracic spine. There is no evidence of high-grade canal stenosis, spinal cord signal or contour abnormality in the thoracic spine. No evidence of abnormal enhancement within the spinal canal.   Lumbar spine: There is straightening of lumbar lordosis. Minimal degenerative anterolisthesis of L4 over L5, and degenerative retrolisthesis of L2 over L3 similar to prior. There is interval development of extensive bone marrow edema  and intra discal edema at L3-L4 since September 2022. There is an enhancing rounded Schmorl node within the central inferior endplate of L3 extending into the disc space. There is focal enhancement and edema of the left L3 transverse process, axial image 22 of 46 consistent with a nondisplaced fracture.   There is focal T1 hypointense enhancing bone marrow lesion within the right iliac bone unchanged from prior, axial image 40 of 46, which is nonspecific and may represent an atypical hemangioma, axial image 40 of 46.   Previously seen T1 hypointense T2 hyperintense nodule within the right T12-L1 neural foramen is unchanged in size and demonstrates diffuse enhancement, measuring 1.1 x 0.7 cm, likely representing a nerve root schwannoma.   T12-L2: No significant disc bulge or herniation. No significant central canal or foraminal stenosis. Note there is an enhancing homogeneous nodule within the right T12-L1 neural foramen consistent with a schwannoma. L2-L3: Mild retrolisthesis of L3 over L4 and facet hypertrophy without central canal stenosis. There is mild-to-moderate bilateral foraminal stenosis due to osseous spurring similar to prior. L3-L4: There is ventral epidural enhancement posterior to the L3 and L4 vertebral bodies which may represent reactive venous epidural plexus versus phlegmon. There is also enhancing disc material in the ventral epidural space measuring approximately 1.0 cm craniocaudally and point 6 cm in anterior-posterior dimension, sagittal image 10 of 19. This contributes to mild left subarticular stenosis without significant central canal stenosis. There is moderate to severe lbilateral foraminal stenosis due to progressing disc space narrowing with disc bulge and facet hypertrophy. There is increasing left foraminal disc protrusion. There is mild enhancement of the bilateral facets with small right-sided facet effusion, unchanged from September 2023, likely degenerative. L4-L5: There is  similar moderate right greater than left foraminal stenosis due to facet hypertrophy and disc bulge. There is enhancing right-sided facet effusion, likely degenerative similar to prior. L5-S1: No significant disc bulge or herniation. No significant canal stenosis.     Other:         Cervical spine: No evidence of focal suspicious bone marrow lesions or discitis osteomyelitis in the cervical spine. Multilevel uncal and synovial facet arthropathy in the cervical spine with resultant moderate central canal stenosis at C3-C4 and C5-C6. There is severe left foraminal stenosis at C6-C7 due to posterolateral osseous spurring.   Thoracic spine: Endplate degenerative change throughout the upper and midthoracic spine without high-grade canal stenosis, bone marrow edema, or spinal cord abnormality.   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 There is posterior central enhancing soft tissue at the level of L3-L4 consistent with phlegmonous change, without evidence of significant central canal stenosis at this level or epidural abscess. There is an enhancing Schmorl node within the inferior endplate of L3 with extension into the disc space. There is right greater than left paravertebral psoas muscle edema at the level of L3-L4 related to the discitis osteomyelitis, without evidence of a fluid collection. There is interval progression of bilateral foraminal stenosis at L3-L4 due to increased disc space narrowing with diffuse disc bulge and facet hypertrophy.   Unchanged moderate degenerative bilateral foraminal stenosis at L4-L5. No evidence of significant central canal stenosis.   Previously seen enhancing soft tissue within the right T12-L1 neural foramen is unchanged in size and demonstrates homogeneous enhancement consistent with a schwannoma. There is otherwise no abnormal leptomeningeal enhancement. Nonspecific unchanged in size enhancing bone marrow lesion within the right posterior ilium which may possibly represent  an atypical hemangioma. Attention on follow-up is recommended.   Nondisplaced subacute fracture of the left L3 transverse process.   Signed by: Fede Holcomb 1/9/2024 11:34 PM Dictation workstation:   [id number]    CT lumbar spine wo IV contrast    Result Date: 1/9/2024  Interpreted By:  Salud Fisher, STUDY: CT CERVICAL SPINE WO IV CONTRAST; CT LUMBAR SPINE WO IV CONTRAST; CT THORACIC SPINE WO IV CONTRAST;  1/9/2024 6:47 pm; 1/9/2024 6:50 pm   INDICATION: Signs/Symptoms:frequent falls; Signs/Symptoms:low back pain leg weakness, frequent falls; Signs/Symptoms:frequent falls, pain.   COMPARISON: MRI lumbar spine 09/05/2023, CT pelvis 10/07/2023   ACCESSION NUMBER(S): [id number]; [id number]; [id number]   ORDERING CLINICIAN: GERSON BRYANT   TECHNIQUE: Axial noncontrast images of the cervical, thoracic and lumbar spine with coronal and sagittal reconstructed images.   FINDINGS: CERVICAL SPINE: ALIGNMENT: Minimal retrolisthesis of C3 on C4, C4 on C5 and C5 on C6. Facet joint and craniocervical line maintained. VERTEBRAE: Chronic endplates changes and subchondral cysts. Vertebral body heights are grossly maintained with demineralization. SPINAL CANAL: Degenerative changes facet and uncinate arthropathy, as well as disc space narrowing and end plate hypertrophy. C3-C4 with mild canal and foraminal narrowing. C5-C6 with moderate foraminal narrowing. C6-C7 with moderate foraminal narrowing. No critical canal stenosis. PREVERTEBRAL SOFT TISSUES: No prevertebral soft tissue swelling. LUNG APICES: Imaged portion of the lung apices are within normal limits.   THORACIC SPINE: ALIGNMENT: Dextroscoliosis of the midthoracic spine. Facet joint alignment maintained. VERTEBRAE: Demineralization without acute compression deformity or fracture identified. SPINAL CANAL: Multilevel degenerative changes including intervertebral disc space narrowing and endplate hypertrophy. No critical spinal canal stenosis. PREVERTEBRAL SOFT  TISSUES: No prevertebral soft tissue swelling.   Significant aortic and coronary artery calcifications identified. Probable atelectasis.   LUMBAR SPINE: ALIGNMENT: Minimal retrolisthesis of L1 on L2, L2 on L3 and L3 on L4. Minimal anterolisthesis of L4 on L5. Levoscoliosis centered about L2-L3. VERTEBRAE: Interval progression of endplate lucencies, lytic lesions and sclerosis along the inferior L3 vertebral body and superior L4 vertebral body without significant widening of the disc space. Findings are progressed since CT pelvis and possibly new since prior MRI vertebral body heights are otherwise grossly maintained no acute displaced fracture identified. SPINAL CANAL: Significant degenerative changes are again noted at L2-L3 with loss of intervertebral disc space and endplate sclerosis, greater on the right. Multilevel facet hypertrophy. L3 L4 disc bulge and posterior ligamentous hypertrophy with mild-to-moderate canal narrowing and at least moderate lateral recess stenosis as well as moderate to severe foraminal stenosis. Severe foraminal stenosis at L2-L3. PREVERTEBRAL SOFT TISSUES: No prevertebral soft tissue swelling.   OTHER FINDINGS: None.       Interval progression of lucent/lytic/erosive changes involving the inferior L3 and superior L4 vertebral bodies without significant disc space narrowing. Findings are progressed from prior CT pelvis and appear new from prior MRI given differences in technique. Changes are indeterminate and could represent discitis/osteomyelitis, malignancy or less likely degenerative changes. Clinical correlation and follow-up MRI with contrast may be considered.   Otherwise no acute fracture or dislocation. No critical canal stenosis identified.   Degenerative changes most significant at L2-L3 and up to moderate canal narrowing suspected at L3-L4.   Mild scoliosis.   MACRO: None.   Signed by: Salud Fisher 1/9/2024 7:44 PM Dictation workstation:   [id number]    CT thoracic spine  wo IV contrast    Result Date: 1/9/2024  Interpreted By:  Salud Fisher, STUDY: CT CERVICAL SPINE WO IV CONTRAST; CT LUMBAR SPINE WO IV CONTRAST; CT THORACIC SPINE WO IV CONTRAST;  1/9/2024 6:47 pm; 1/9/2024 6:50 pm   INDICATION: Signs/Symptoms:frequent falls; Signs/Symptoms:low back pain leg weakness, frequent falls; Signs/Symptoms:frequent falls, pain.   COMPARISON: MRI lumbar spine 09/05/2023, CT pelvis 10/07/2023   ACCESSION NUMBER(S): [id number]; [id number]; [id number]   ORDERING CLINICIAN: GERSON BRYANT   TECHNIQUE: Axial noncontrast images of the cervical, thoracic and lumbar spine with coronal and sagittal reconstructed images.   FINDINGS: CERVICAL SPINE: ALIGNMENT: Minimal retrolisthesis of C3 on C4, C4 on C5 and C5 on C6. Facet joint and craniocervical line maintained. VERTEBRAE: Chronic endplates changes and subchondral cysts. Vertebral body heights are grossly maintained with demineralization. SPINAL CANAL: Degenerative changes facet and uncinate arthropathy, as well as disc space narrowing and end plate hypertrophy. C3-C4 with mild canal and foraminal narrowing. C5-C6 with moderate foraminal narrowing. C6-C7 with moderate foraminal narrowing. No critical canal stenosis. PREVERTEBRAL SOFT TISSUES: No prevertebral soft tissue swelling. LUNG APICES: Imaged portion of the lung apices are within normal limits.   THORACIC SPINE: ALIGNMENT: Dextroscoliosis of the midthoracic spine. Facet joint alignment maintained. VERTEBRAE: Demineralization without acute compression deformity or fracture identified. SPINAL CANAL: Multilevel degenerative changes including intervertebral disc space narrowing and endplate hypertrophy. No critical spinal canal stenosis. PREVERTEBRAL SOFT TISSUES: No prevertebral soft tissue swelling.   Significant aortic and coronary artery calcifications identified. Probable atelectasis.   LUMBAR SPINE: ALIGNMENT: Minimal retrolisthesis of L1 on L2, L2 on L3 and L3 on L4. Minimal  anterolisthesis of L4 on L5. Levoscoliosis centered about L2-L3. VERTEBRAE: Interval progression of endplate lucencies, lytic lesions and sclerosis along the inferior L3 vertebral body and superior L4 vertebral body without significant widening of the disc space. Findings are progressed since CT pelvis and possibly new since prior MRI vertebral body heights are otherwise grossly maintained no acute displaced fracture identified. SPINAL CANAL: Significant degenerative changes are again noted at L2-L3 with loss of intervertebral disc space and endplate sclerosis, greater on the right. Multilevel facet hypertrophy. L3 L4 disc bulge and posterior ligamentous hypertrophy with mild-to-moderate canal narrowing and at least moderate lateral recess stenosis as well as moderate to severe foraminal stenosis. Severe foraminal stenosis at L2-L3. PREVERTEBRAL SOFT TISSUES: No prevertebral soft tissue swelling.   OTHER FINDINGS: None.       Interval progression of lucent/lytic/erosive changes involving the inferior L3 and superior L4 vertebral bodies without significant disc space narrowing. Findings are progressed from prior CT pelvis and appear new from prior MRI given differences in technique. Changes are indeterminate and could represent discitis/osteomyelitis, malignancy or less likely degenerative changes. Clinical correlation and follow-up MRI with contrast may be considered.   Otherwise no acute fracture or dislocation. No critical canal stenosis identified.   Degenerative changes most significant at L2-L3 and up to moderate canal narrowing suspected at L3-L4.   Mild scoliosis.   MACRO: None.   Signed by: Saldu Fisher 1/9/2024 7:44 PM Dictation workstation:   [id number]    CT cervical spine wo IV contrast    Result Date: 1/9/2024  Interpreted By:  Salud Fisher, STUDY: CT CERVICAL SPINE WO IV CONTRAST; CT LUMBAR SPINE WO IV CONTRAST; CT THORACIC SPINE WO IV CONTRAST;  1/9/2024 6:47 pm; 1/9/2024 6:50 pm   INDICATION:  Signs/Symptoms:frequent falls; Signs/Symptoms:low back pain leg weakness, frequent falls; Signs/Symptoms:frequent falls, pain.   COMPARISON: MRI lumbar spine 09/05/2023, CT pelvis 10/07/2023   ACCESSION NUMBER(S): [id number]; [id number]; [id number]   ORDERING CLINICIAN: GERSON BRYANT   TECHNIQUE: Axial noncontrast images of the cervical, thoracic and lumbar spine with coronal and sagittal reconstructed images.   FINDINGS: CERVICAL SPINE: ALIGNMENT: Minimal retrolisthesis of C3 on C4, C4 on C5 and C5 on C6. Facet joint and craniocervical line maintained. VERTEBRAE: Chronic endplates changes and subchondral cysts. Vertebral body heights are grossly maintained with demineralization. SPINAL CANAL: Degenerative changes facet and uncinate arthropathy, as well as disc space narrowing and end plate hypertrophy. C3-C4 with mild canal and foraminal narrowing. C5-C6 with moderate foraminal narrowing. C6-C7 with moderate foraminal narrowing. No critical canal stenosis. PREVERTEBRAL SOFT TISSUES: No prevertebral soft tissue swelling. LUNG APICES: Imaged portion of the lung apices are within normal limits.   THORACIC SPINE: ALIGNMENT: Dextroscoliosis of the midthoracic spine. Facet joint alignment maintained. VERTEBRAE: Demineralization without acute compression deformity or fracture identified. SPINAL CANAL: Multilevel degenerative changes including intervertebral disc space narrowing and endplate hypertrophy. No critical spinal canal stenosis. PREVERTEBRAL SOFT TISSUES: No prevertebral soft tissue swelling.   Significant aortic and coronary artery calcifications identified. Probable atelectasis.   LUMBAR SPINE: ALIGNMENT: Minimal retrolisthesis of L1 on L2, L2 on L3 and L3 on L4. Minimal anterolisthesis of L4 on L5. Levoscoliosis centered about L2-L3. VERTEBRAE: Interval progression of endplate lucencies, lytic lesions and sclerosis along the inferior L3 vertebral body and superior L4 vertebral body without significant  widening of the disc space. Findings are progressed since CT pelvis and possibly new since prior MRI vertebral body heights are otherwise grossly maintained no acute displaced fracture identified. SPINAL CANAL: Significant degenerative changes are again noted at L2-L3 with loss of intervertebral disc space and endplate sclerosis, greater on the right. Multilevel facet hypertrophy. L3 L4 disc bulge and posterior ligamentous hypertrophy with mild-to-moderate canal narrowing and at least moderate lateral recess stenosis as well as moderate to severe foraminal stenosis. Severe foraminal stenosis at L2-L3. PREVERTEBRAL SOFT TISSUES: No prevertebral soft tissue swelling.   OTHER FINDINGS: None.       Interval progression of lucent/lytic/erosive changes involving the inferior L3 and superior L4 vertebral bodies without significant disc space narrowing. Findings are progressed from prior CT pelvis and appear new from prior MRI given differences in technique. Changes are indeterminate and could represent discitis/osteomyelitis, malignancy or less likely degenerative changes. Clinical correlation and follow-up MRI with contrast may be considered.   Otherwise no acute fracture or dislocation. No critical canal stenosis identified.   Degenerative changes most significant at L2-L3 and up to moderate canal narrowing suspected at L3-L4.   Mild scoliosis.   MACRO: None.   Signed by: Salud Fisher 1/9/2024 7:44 PM Dictation workstation:   [id number]    CT head wo IV contrast    Result Date: 1/9/2024  Interpreted By:  Salud Fisher, STUDY: CT HEAD WO IV CONTRAST;  1/9/2024 6:47 pm   INDICATION: Signs/Symptoms:dizziness, frequent falls, head injury.   COMPARISON: 06/04/2016   ACCESSION NUMBER(S): [id number]   ORDERING CLINICIAN: GERSON BRYANT   TECHNIQUE: Axial noncontrast CT images of the head.   FINDINGS: BRAIN PARENCHYMA: Gray-white matter interfaces are preserved. No mass effect or midline shift. Generalized parenchymal  volume loss noted with concordant ventricular enlargement. Non-specific white matter changes noted, which may be related to small vessel disease.   HEMORRHAGE: No acute intracranial hemorrhage. VENTRICLES and EXTRA-AXIAL SPACES: The ventricles, sulci and basal cisterns enlarged, concordant with parenchymal volume loss. EXTRACRANIAL SOFT TISSUES: Within normal limits. PARANASAL SINUSES/MASTOIDS: The visualized paranasal sinuses and mastoid air cells are aerated. CALVARIUM: No depressed skull fracture. No destructive osseous lesion.   OTHER FINDINGS: None.       No acute intracranial hemorrhage or mass effect.     MACRO: None.   Signed by: Salud Fisher 1/9/2024 7:23 PM Dictation workstation:   [id number]    XR pelvis 1-2 views    Result Date: 1/9/2024  Interpreted By:  Salud Fisher, STUDY: XR PELVIS 1-2 VIEWS; ;  1/9/2024 6:34 pm   INDICATION: Signs/Symptoms:fall.   COMPARISON: None.   ACCESSION NUMBER(S): [id number]   ORDERING CLINICIAN: GERSON BRYANT   FINDINGS: AP pelvis   Degenerative changes of the bilateral hip joints. Scattered surgical clips in the medial upper thighs. Vascular calcifications noted.   No acute fracture, diastasis or dislocation identified.       No acute osseous abnormality identified.     MACRO: None   Signed by: Salud Fisher 1/9/2024 7:19 PM Dictation workstation:   [id number]    XR chest 1 view    Result Date: 1/9/2024  Interpreted By:  Salud Fisher, STUDY: XR CHEST 1 VIEW;  1/9/2024 6:34 pm   INDICATION: Signs/Symptoms:weakness.   COMPARISON: 01/21/2023   ACCESSION NUMBER(S): [id number]   ORDERING CLINICIAN: GERSON BRYANT   FINDINGS: AP radiograph of the chest was provided.       CARDIOMEDIASTINAL SILHOUETTE: Cardiomediastinal silhouette is normal in size and configuration. Atherosclerotic calcifications of the aortic arch.   LUNGS: No focal consolidation, pleural effusion or sizable pneumothorax seen.   ABDOMEN: No remarkable upper abdominal findings.   BONES: No  acute osseous changes.       1.  No focal consolidation or sizeable pleural effusion.       MACRO: None   Signed by: Salud Fisher 1/9/2024 7:12 PM Dictation workstation:   [id number]   Scheduled medications   Medication Dose Route Frequency    amLODIPine  10 mg oral Daily    aspirin  81 mg oral Daily    atorvastatin  40 mg oral Nightly    carvedilol  6.25 mg oral BID    cefepime  2 g intravenous q12h    gabapentin  100 mg oral TID    gemfibrozil  600 mg oral BID AC    insulin lispro  0-15 Units subcutaneous TID with meals    losartan  50 mg oral BID    pantoprazole  40 mg oral Daily    Or    pantoprazole  40 mg intravenous Daily    vancomycin  1,250 mg intravenous q24h                                                     Assessment/Plan      Principal Problem:    Intractable low back pain  Active Problems:    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    Dizziness    Near syncope    Generalized weakness    Discitis of lumbar region    Osteomyelitis of lumbar spine (CMS/HCC)    Type 2 diabetes mellitus with retinopathy, with long-term current use of insulin (CMS/HCC)    Impression:  Ataxia, mild dysarthria, left facial droop-need to r/o CVA, mass lesion in brainstem/cerebellum  History of discitis/osteomyelitis      Plan:    MRI brain w/o is pending  Nutritional workup ordered (B1, B6, B12, Vitamin E, coppper, zinc, TSH, SPEP, UPEP, and consider hemoglobin A1C )    I have discussed the patient and the plan of care with the Neurologist on-call, Dr. Blackwood.         I spent 25 minutes in the professional and overall care of this patient.      Shiloh Cueva, APRN-CNP

## 2024-01-11 NOTE — CARE PLAN
Problem: Fall/Injury  Goal: Not fall by end of shift  Outcome: Progressing  Goal: Be free from injury by end of the shift  Outcome: Progressing  Goal: Verbalize understanding of personal risk factors for fall in the hospital  Outcome: Progressing  Goal: Verbalize understanding of risk factor reduction measures to prevent injury from fall in the home  Outcome: Progressing  Goal: Use assistive devices by end of the shift  Outcome: Progressing  Goal: Pace activities to prevent fatigue by end of the shift  Outcome: Progressing     Problem: Pain - Adult  Goal: Verbalizes/displays adequate comfort level or baseline comfort level  Outcome: Progressing     Problem: Safety - Adult  Goal: Free from fall injury  Outcome: Progressing     Problem: Discharge Planning  Goal: Discharge to home or other facility with appropriate resources  Outcome: Progressing     Problem: Chronic Conditions and Co-morbidities  Goal: Patient's chronic conditions and co-morbidity symptoms are monitored and maintained or improved  Outcome: Progressing     Problem: Diabetes  Goal: Achieve decreasing blood glucose levels by end of shift  Outcome: Progressing  Goal: Increase stability of blood glucose readings by end of shift  Outcome: Progressing  Goal: Decrease in ketones present in urine by end of shift  Outcome: Progressing  Goal: Maintain electrolyte levels within acceptable range throughout shift  Outcome: Progressing  Goal: Maintain glucose levels >70mg/dl to <250mg/dl throughout shift  Outcome: Progressing  Goal: No changes in neurological exam by end of shift  Outcome: Progressing  Goal: Learn about and adhere to nutrition recommendations by end of shift  Outcome: Progressing  Goal: Vital signs within normal range for age by end of shift  Outcome: Progressing  Goal: Increase self care and/or family involovement by end of shift  Outcome: Progressing  Goal: Receive DSME education by end of shift  Outcome: Progressing

## 2024-01-11 NOTE — DISCHARGE SUMMARY
Discharge Diagnosis  Intractable low back pain    Issues Requiring Follow-Up  Patient being transferred to [facility]    Discharge Meds     Your medication list        START taking these medications        Instructions Last Dose Given Next Dose Due   cefepime IV  Commonly known as: Maxipime      Infuse 100 mL (2 g) at 200 mL/hr over 30 minutes into a venous catheter every 12 hours.       vancomycin 1250 mg/250 mL IV  Commonly known as: Vancocin  Start taking on: January 12, 2024      Infuse 250 mL (1,250 mg) at 200 mL/hr over 75 minutes into a venous catheter once every 24 hours. Do not start before January 12, 2024.              CONTINUE taking these medications        Instructions Last Dose Given Next Dose Due   acetaminophen 325 mg tablet  Commonly known as: Tylenol           alogliptin 12.5 mg tablet  Commonly known as: Nesina           amLODIPine 10 mg tablet  Commonly known as: Norvasc           aspirin 81 mg EC tablet           atorvastatin 80 mg tablet  Commonly known as: Lipitor           carvedilol 6.25 mg tablet  Commonly known as: Coreg           chlorthalidone 25 mg tablet  Commonly known as: Hygroton           cyanocobalamin 1,000 mcg tablet  Commonly known as: Vitamin B-12           cyclobenzaprine 5 mg tablet  Commonly known as: Flexeril           DULoxetine 30 mg DR capsule  Commonly known as: Cymbalta           gabapentin 100 mg capsule  Commonly known as: Neurontin           gemfibrozil 600 mg tablet  Commonly known as: Lopid           lidocaine 4 % dressing      Apply topically if needed for mild pain (1 - 3).       lidocaine 5 % patch  Commonly known as: Lidoderm      Place 1 patch over 12 hours on the skin once daily. Remove & discard patch within 12 hours or as directed by MD.       losartan 50 mg tablet  Commonly known as: Cozaar                     Where to Get Your Medications        Information about where to get these medications is not yet available    Ask your nurse or doctor about  these medications  cefepime IV  vancomycin 1250 mg/250 mL IV         Test Results Pending At Discharge  Pending Labs       Order Current Status    Copper, serum In process    Serum Protein Electrophoresis + Immunofixation In process    Serum Protein Electrophoresis + Immunofixation In process    Urine Protein Electrophoresis In process    Urine Protein Electrophoresis In process    Vitamin B1, whole blood In process    Vitamin B6 In process    Vitamin E In process    Zinc In process    Blood Culture Preliminary result    Blood Culture Preliminary result            Hospital Course  Ant Alcala is a 83 y.o. male presenting with dizziness, multiple falls and back pain.  Patient reported that his dizziness was sudden, usually when standing up using his walker.  He had multiple episodes of sudden dizziness since yesterday with some spinning sensation.  Patient also reported feeling weak in both legs and upper extremities so he sat down to avoid trauma.  He did not hit his head.  No loss of consciousness.  Patient denied fever, vomiting, diarrhea or shortness of breath.  Patient had history of cervical spinal stenosis.  Patient also has discitis osteomyelitis of lumbar spine.  [facility] infection.  He was discharged on 12/29/2023.  He is on oral antibiotics now. His labs shows no leukocytosis.  He has elevated C-reactive protein and ESR.  He has hyperglycemia 320 and mild elevation of lactate likely from dehydration. CT head was negative for acute intracranial pathology.  MRI cervical spine shows moderate central canal stenosis at C3-C6, with severe foraminal stenosis MRI thoracic spine shows evidence of schwannoma at right T12/L1.  MRI lumbar spine shows discitis osteomyelitis at the level L3/L4.  There is enhancing Schmorl node within the inferior endplate of L3.  There is right greater than left paravertebral psoas muscle edema at the  level of L3/L4 related to discitis osteomyelitis no abscess collection. There is nondisplaced subacute fracture of the left L3 transverse process.  Patient was admitted and followed by neurology and infectious disease.  MRI of the brain is pending, nutritional workup was ordered.  Follow-up lumbar discitis and OM was treated with 6 weeks IV antibiotics last dose was December 29.  Patient was recently admitted with a fall from [facility].  ID recommendation continue IV Vanco and cefepime for 6 weeks and [facility].  Recommend neurosurgery consult post transfer.  Patient may need repeat biopsy with bacterial and fungal and AFB stains and cultures.  At this time patient was accepted to [facility] and will be transferred for further treatment.  Pickup scheduled for 4:00 PM.  Patient will be under Dr. Lai.  On day of discharge patient hemodynamically stable.       Pertinent Physical Exam At Time of Discharge  Physical Exam  Constitutional:       Appearance: Normal appearance.   HENT:      Head: Normocephalic.      Mouth/Throat:      Mouth: Mucous membranes are moist.   Eyes:      Pupils: Pupils are equal, round, and reactive to light.   Cardiovascular:      Rate and Rhythm: Normal rate and regular rhythm.      Heart sounds: Normal heart sounds, S1 normal and S2 normal.   Pulmonary:      Effort: Pulmonary effort is normal.      Breath sounds: Normal breath sounds.   Abdominal:      General: Bowel sounds are normal.      Palpations: Abdomen is soft.   Musculoskeletal:         General: Normal range of motion.      Cervical back: Neck supple.   Skin:     General: Skin is warm.   Neurological:      Mental Status: He is alert and oriented to person, place, and time.      Motor: Weakness present.   Psychiatric:         Mood and Affect: Mood normal.         Behavior: Behavior normal.         Outpatient Follow-Up  No future appointments.      Denise Wheat, APRN-CNP

## 2024-01-11 NOTE — CONSULTS
Social Work Note  The LSW assisted in the pt's care plan goal of transferring to the [facility]. The L[facility] has [facility] [phone] at 9:15am. The pt has since then been accepted by the [facility] once a Covid negative result is completed and faxed to [phone]. Awaiting the Covid negative result.  Ambulance Arrangements have been made while pending the necessary Covid negative result. [facility]  at 4:00pm to [facility] to room 4 B under Dr. Lai. Report number of [phone] has been provided to the Unit RN. [facility] that the [facility]'s complete chart be copied and sent with the pt also.

## 2024-01-13 LAB
COPPER SERPL-MCNC: 126.6 UG/DL (ref 70–140)
ZINC SERPL-MCNC: 66.9 UG/DL (ref 60–120)

## 2024-01-14 LAB
A-TOCOPHEROL VIT E SERPL-MCNC: 12.4 MG/L (ref 5.5–18)
BACTERIA BLD CULT: NORMAL
BACTERIA BLD CULT: NORMAL
BETA+GAMMA TOCOPHEROL SERPL-MCNC: 0.5 MG/L (ref 0–6)
PYRIDOXAL PHOS SERPL-SCNC: 29.6 NMOL/L (ref 20–125)

## 2024-01-16 LAB
ALBUMIN MFR UR ELPH: 45.9 %
ALBUMIN: 3.2 G/DL (ref 3.4–5)
ALPHA 1 GLOBULIN: 0.4 G/DL (ref 0.2–0.6)
ALPHA 2 GLOBULIN: 1 G/DL (ref 0.4–1.1)
ALPHA1 GLOB MFR UR ELPH: 11.8 %
ALPHA2 GLOB MFR UR ELPH: 16.8 %
B-GLOBULIN MFR UR ELPH: 14.7 %
BETA GLOBULIN: 0.7 G/DL (ref 0.5–1.2)
GAMMA GLOB MFR UR ELPH: 10.8 %
GAMMA GLOBULIN: 0.7 G/DL (ref 0.5–1.4)
IMMUNOFIXATION COMMENT: ABNORMAL
IMMUNOFIXATION COMMENT: NORMAL
M-PROTEIN 1: 0.1 G/DL
PATH REVIEW - SERUM IMMUNOFIXATION: ABNORMAL
PATH REVIEW - URINE IMMUNOFIXATION: NORMAL
PATH REVIEW-SERUM PROTEIN ELECTROPHORESIS: ABNORMAL
PATH REVIEW-URINE PROTEIN ELECTROPHORESIS: NORMAL
PROTEIN ELECTROPHORESIS COMMENT: ABNORMAL
URINE ELECTROPHORESIS COMMENT: NORMAL
VIT B1 PYROPHOSHATE BLD-SCNC: 125 NMOL/L (ref 70–180)

## 2024-01-21 ENCOUNTER — HOSPITAL ENCOUNTER (EMERGENCY)
Facility: HOSPITAL | Age: 84
Discharge: HOME | End: 2024-01-22
Payer: MEDICARE

## 2024-01-21 ENCOUNTER — APPOINTMENT (OUTPATIENT)
Dept: RADIOLOGY | Facility: HOSPITAL | Age: 84
End: 2024-01-21
Payer: MEDICARE

## 2024-01-21 DIAGNOSIS — K59.00 CONSTIPATION, UNSPECIFIED CONSTIPATION TYPE: Primary | ICD-10-CM

## 2024-01-21 LAB
ALBUMIN SERPL BCP-MCNC: 3.8 G/DL (ref 3.4–5)
ALP SERPL-CCNC: 62 U/L (ref 33–136)
ALT SERPL W P-5'-P-CCNC: 11 U/L (ref 10–52)
ANION GAP SERPL CALC-SCNC: 16 MMOL/L (ref 10–20)
AST SERPL W P-5'-P-CCNC: 16 U/L (ref 9–39)
BASOPHILS # BLD AUTO: 0.09 X10*3/UL (ref 0–0.1)
BASOPHILS NFR BLD AUTO: 1 %
BILIRUB SERPL-MCNC: 0.4 MG/DL (ref 0–1.2)
BUN SERPL-MCNC: 18 MG/DL (ref 6–23)
CALCIUM SERPL-MCNC: 9.3 MG/DL (ref 8.6–10.3)
CHLORIDE SERPL-SCNC: 100 MMOL/L (ref 98–107)
CO2 SERPL-SCNC: 25 MMOL/L (ref 21–32)
CREAT SERPL-MCNC: 1.08 MG/DL (ref 0.5–1.3)
EGFRCR SERPLBLD CKD-EPI 2021: 69 ML/MIN/1.73M*2
EOSINOPHIL # BLD AUTO: 0.17 X10*3/UL (ref 0–0.4)
EOSINOPHIL NFR BLD AUTO: 1.9 %
ERYTHROCYTE [DISTWIDTH] IN BLOOD BY AUTOMATED COUNT: 14 % (ref 11.5–14.5)
GLUCOSE SERPL-MCNC: 299 MG/DL (ref 74–99)
HCT VFR BLD AUTO: 33 % (ref 41–52)
HGB BLD-MCNC: 10.8 G/DL (ref 13.5–17.5)
IMM GRANULOCYTES # BLD AUTO: 0.02 X10*3/UL (ref 0–0.5)
IMM GRANULOCYTES NFR BLD AUTO: 0.2 % (ref 0–0.9)
LYMPHOCYTES # BLD AUTO: 2.07 X10*3/UL (ref 0.8–3)
LYMPHOCYTES NFR BLD AUTO: 23.3 %
MAGNESIUM SERPL-MCNC: 1.99 MG/DL (ref 1.6–2.4)
MCH RBC QN AUTO: 28.6 PG (ref 26–34)
MCHC RBC AUTO-ENTMCNC: 32.7 G/DL (ref 32–36)
MCV RBC AUTO: 88 FL (ref 80–100)
MONOCYTES # BLD AUTO: 0.99 X10*3/UL (ref 0.05–0.8)
MONOCYTES NFR BLD AUTO: 11.1 %
NEUTROPHILS # BLD AUTO: 5.54 X10*3/UL (ref 1.6–5.5)
NEUTROPHILS NFR BLD AUTO: 62.5 %
NRBC BLD-RTO: 0 /100 WBCS (ref 0–0)
PLATELET # BLD AUTO: 445 X10*3/UL (ref 150–450)
POTASSIUM SERPL-SCNC: 4.1 MMOL/L (ref 3.5–5.3)
PROT SERPL-MCNC: 6.3 G/DL (ref 6.4–8.2)
RBC # BLD AUTO: 3.77 X10*6/UL (ref 4.5–5.9)
SODIUM SERPL-SCNC: 137 MMOL/L (ref 136–145)
WBC # BLD AUTO: 8.9 X10*3/UL (ref 4.4–11.3)

## 2024-01-21 PROCEDURE — 74177 CT ABD & PELVIS W/CONTRAST: CPT | Performed by: RADIOLOGY

## 2024-01-21 PROCEDURE — 83735 ASSAY OF MAGNESIUM: CPT | Performed by: PHYSICIAN ASSISTANT

## 2024-01-21 PROCEDURE — 36415 COLL VENOUS BLD VENIPUNCTURE: CPT | Performed by: PHYSICIAN ASSISTANT

## 2024-01-21 PROCEDURE — 74177 CT ABD & PELVIS W/CONTRAST: CPT

## 2024-01-21 PROCEDURE — 2550000001 HC RX 255 CONTRASTS: Performed by: PHYSICIAN ASSISTANT

## 2024-01-21 PROCEDURE — 80053 COMPREHEN METABOLIC PANEL: CPT | Performed by: PHYSICIAN ASSISTANT

## 2024-01-21 PROCEDURE — 99284 EMERGENCY DEPT VISIT MOD MDM: CPT

## 2024-01-21 PROCEDURE — 85025 COMPLETE CBC W/AUTO DIFF WBC: CPT | Performed by: PHYSICIAN ASSISTANT

## 2024-01-21 RX ORDER — LACTULOSE 10 G/15ML
30 SOLUTION ORAL DAILY
Qty: 135 ML | Refills: 0 | Status: SHIPPED | OUTPATIENT
Start: 2024-01-21 | End: 2024-01-24

## 2024-01-21 RX ADMIN — IOHEXOL 75 ML: 350 INJECTION, SOLUTION INTRAVENOUS at 21:29

## 2024-01-21 ASSESSMENT — PAIN SCALES - GENERAL
PAINLEVEL_OUTOF10: 0 - NO PAIN
PAINLEVEL_OUTOF10: 2

## 2024-01-21 ASSESSMENT — COLUMBIA-SUICIDE SEVERITY RATING SCALE - C-SSRS
2. HAVE YOU ACTUALLY HAD ANY THOUGHTS OF KILLING YOURSELF?: NO
6. HAVE YOU EVER DONE ANYTHING, STARTED TO DO ANYTHING, OR PREPARED TO DO ANYTHING TO END YOUR LIFE?: NO
1. IN THE PAST MONTH, HAVE YOU WISHED YOU WERE DEAD OR WISHED YOU COULD GO TO SLEEP AND NOT WAKE UP?: NO

## 2024-01-21 ASSESSMENT — PAIN - FUNCTIONAL ASSESSMENT: PAIN_FUNCTIONAL_ASSESSMENT: 0-10

## 2024-01-21 ASSESSMENT — PAIN DESCRIPTION - PROGRESSION: CLINICAL_PROGRESSION: NOT CHANGED

## 2024-01-21 ASSESSMENT — LIFESTYLE VARIABLES
EVER HAD A DRINK FIRST THING IN THE MORNING TO STEADY YOUR NERVES TO GET RID OF A HANGOVER: NO
HAVE YOU EVER FELT YOU SHOULD CUT DOWN ON YOUR DRINKING: NO
EVER FELT BAD OR GUILTY ABOUT YOUR DRINKING: NO
REASON UNABLE TO ASSESS: NO
HAVE PEOPLE ANNOYED YOU BY CRITICIZING YOUR DRINKING: NO

## 2024-01-21 ASSESSMENT — PAIN DESCRIPTION - DESCRIPTORS: DESCRIPTORS: DULL

## 2024-01-21 ASSESSMENT — PAIN DESCRIPTION - PAIN TYPE: TYPE: ACUTE PAIN

## 2024-01-21 ASSESSMENT — PAIN DESCRIPTION - ORIENTATION: ORIENTATION: LEFT;LOWER

## 2024-01-21 ASSESSMENT — PAIN DESCRIPTION - ONSET: ONSET: ONGOING

## 2024-01-21 ASSESSMENT — PAIN DESCRIPTION - LOCATION: LOCATION: ABDOMEN

## 2024-01-21 ASSESSMENT — PAIN DESCRIPTION - FREQUENCY: FREQUENCY: CONSTANT/CONTINUOUS

## 2024-01-21 NOTE — ED PROVIDER NOTES
HPI   Chief Complaint   Patient presents with    Constipation     X1 week, denies n/v         History provided by:  Patient and spouse   used: No         83-year-old male past medical history type 2 diabetes presents with not having a bowel movement for the past week, but is still passing gas.  Denies fever, n/v/d, abd or back pain, dysuria, SOB or CP    ROS negative unless otherwise stated in HPI                   Yung Coma Scale Score: 15                  Patient History   No past medical history on file.  Past Surgical History:   Procedure Laterality Date    US GUIDED BIOPSY PROSTATE  8/21/2019    US GUIDED BIOPSY PROSTATE 8/21/2019 [facility]     No family history on file.  Social History     Tobacco Use    Smoking status: Never    Smokeless tobacco: Never   Substance Use Topics    Alcohol use: Not on file    Drug use: Not on file       Physical Exam   ED Triage Vitals [01/21/24 1713]   Temp Heart Rate Respirations BP   36.2 °C (97.2 °F) 94 18 141/68      Pulse Ox Temp src Heart Rate Source Patient Position   99 % -- Monitor --      BP Location FiO2 (%)     -- --       Physical Exam    General: alert, no distress, well nourished, talking in full and complete sentences  Skin: dry, intact, no rashes  Head: atraumatic  Eyes: EOMI, PERRLA, normal conjunctiva  Throat: no stridor, no hot potato voice  Nose: nares patent  Mouth: mucous membranes moist  Neck: supple  Respiratory: non labored breathing  Abd: soft, NT, normal BS, no peritoneal signs  Spine: no CVA tenderness  Extremities: FROM X4, strength +5/5,  capillary refill intact  Neuro: A&Ox3  Psych: cooperative, appropriate mood      ED Course & MDM   Diagnoses as of 01/21/24 2207   Constipation, unspecified constipation type     Labs Reviewed   CBC WITH AUTO DIFFERENTIAL - Abnormal       Result Value    WBC 8.9      nRBC 0.0      RBC 3.77 (*)     Hemoglobin 10.8 (*)     Hematocrit 33.0 (*)     MCV 88      MCH 28.6      MCHC 32.7       RDW 14.0      Platelets 445      Neutrophils % 62.5      Immature Granulocytes %, Automated 0.2      Lymphocytes % 23.3      Monocytes % 11.1      Eosinophils % 1.9      Basophils % 1.0      Neutrophils Absolute 5.54 (*)     Immature Granulocytes Absolute, Automated 0.02      Lymphocytes Absolute 2.07      Monocytes Absolute 0.99 (*)     Eosinophils Absolute 0.17      Basophils Absolute 0.09     COMPREHENSIVE METABOLIC PANEL - Abnormal    Glucose 299 (*)     Sodium 137      Potassium 4.1      Chloride 100      Bicarbonate 25      Anion Gap 16      Urea Nitrogen 18      Creatinine 1.08      eGFR 69      Calcium 9.3      Albumin 3.8      Alkaline Phosphatase 62      Total Protein 6.3 (*)     AST 16      Bilirubin, Total 0.4      ALT 11     MAGNESIUM - Normal    Magnesium 1.99        CT abdomen pelvis w IV contrast   Final Result   Large amount of stool in the rectum with a moderate colonic stool   burden elsewhere compatible with reported history of constipation.        Scattered colonic diverticulosis without CT evidence of acute   diverticulitis.        Nonobstructing 2 mm stone in the mid aspect of the left kidney.        Gastric wall thickening versus underdistention. Correlate for   symptoms of gastritis.        CT findings of discitis/osteomyelitis at L3/L4 as seen on prior MRI   and CT 01/09/2024. CT appearance is similar compared to the prior   image.        MACRO:   None.        Signed by: Evan Finkelstein 1/21/2024 10:02 PM   Dictation workstation:   [id number]            Medical Decision Making  Serum glucose 299.  He does have diabetes.  No other significant abnormalities.Final read of CT abdomen pelvis with contrast shows a large amount of stool in the rectum with moderate colonic stool burden elsewhere consistent with constipation.  He will be given a soapsuds enema.  Patient had a large bowel movement.  He will be given a prescription for lactulose and follow-up with family doctor.  Afebrile, not  tachycardic, not tachypneic, not hypoxic, tolerating PO, non toxic appearing and ambulating at baseline at discharge. Hemodynamically intact. All questions answered. Educated on SE of meds. Patient in agreement with treatment.      Procedure  Procedures     Martina Costa PA-C  01/21/24 5144

## 2024-01-22 VITALS
TEMPERATURE: 97.2 F | HEIGHT: 68 IN | HEART RATE: 82 BPM | RESPIRATION RATE: 16 BRPM | BODY MASS INDEX: 24.25 KG/M2 | SYSTOLIC BLOOD PRESSURE: 165 MMHG | OXYGEN SATURATION: 98 % | DIASTOLIC BLOOD PRESSURE: 70 MMHG | WEIGHT: 160 LBS